# Patient Record
Sex: MALE | Race: BLACK OR AFRICAN AMERICAN | HISPANIC OR LATINO | Employment: OTHER | URBAN - METROPOLITAN AREA
[De-identification: names, ages, dates, MRNs, and addresses within clinical notes are randomized per-mention and may not be internally consistent; named-entity substitution may affect disease eponyms.]

---

## 2019-07-30 DIAGNOSIS — M25.561 PAIN IN BOTH KNEES: Primary | ICD-10-CM

## 2019-07-30 DIAGNOSIS — M25.562 PAIN IN BOTH KNEES: Primary | ICD-10-CM

## 2019-08-26 ENCOUNTER — CLINICAL SUPPORT (OUTPATIENT)
Dept: REHABILITATION | Facility: HOSPITAL | Age: 59
End: 2019-08-26
Payer: MEDICARE

## 2019-08-26 DIAGNOSIS — M25.562 CHRONIC PAIN OF BOTH KNEES: Primary | ICD-10-CM

## 2019-08-26 DIAGNOSIS — G89.29 CHRONIC PAIN OF BOTH KNEES: Primary | ICD-10-CM

## 2019-08-26 DIAGNOSIS — R29.898 WEAKNESS OF BOTH LOWER EXTREMITIES: ICD-10-CM

## 2019-08-26 DIAGNOSIS — M25.662 DECREASED RANGE OF MOTION OF BOTH KNEES: ICD-10-CM

## 2019-08-26 DIAGNOSIS — M25.561 CHRONIC PAIN OF BOTH KNEES: Primary | ICD-10-CM

## 2019-08-26 DIAGNOSIS — M25.661 DECREASED RANGE OF MOTION OF BOTH KNEES: ICD-10-CM

## 2019-08-26 PROCEDURE — 97162 PT EVAL MOD COMPLEX 30 MIN: CPT | Mod: PN

## 2019-08-26 PROCEDURE — 97110 THERAPEUTIC EXERCISES: CPT | Mod: PN
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
## 2019-09-11 ENCOUNTER — CLINICAL SUPPORT (OUTPATIENT)
Dept: REHABILITATION | Facility: HOSPITAL | Age: 59
End: 2019-09-11
Payer: MEDICARE

## 2019-09-11 DIAGNOSIS — G89.29 CHRONIC PAIN OF BOTH KNEES: Primary | ICD-10-CM

## 2019-09-11 DIAGNOSIS — M25.562 CHRONIC PAIN OF BOTH KNEES: Primary | ICD-10-CM

## 2019-09-11 DIAGNOSIS — M25.561 CHRONIC PAIN OF BOTH KNEES: Primary | ICD-10-CM

## 2019-09-11 DIAGNOSIS — M25.661 DECREASED RANGE OF MOTION OF BOTH KNEES: ICD-10-CM

## 2019-09-11 DIAGNOSIS — M25.662 DECREASED RANGE OF MOTION OF BOTH KNEES: ICD-10-CM

## 2019-09-11 DIAGNOSIS — R29.898 WEAKNESS OF BOTH LOWER EXTREMITIES: ICD-10-CM

## 2019-09-11 PROCEDURE — 97110 THERAPEUTIC EXERCISES: CPT | Mod: PN
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
## 2019-09-18 ENCOUNTER — CLINICAL SUPPORT (OUTPATIENT)
Dept: REHABILITATION | Facility: HOSPITAL | Age: 59
End: 2019-09-18
Payer: MEDICARE

## 2019-09-18 DIAGNOSIS — R29.898 WEAKNESS OF BOTH LOWER EXTREMITIES: ICD-10-CM

## 2019-09-18 DIAGNOSIS — G89.29 CHRONIC PAIN OF BOTH KNEES: Primary | ICD-10-CM

## 2019-09-18 DIAGNOSIS — M25.561 CHRONIC PAIN OF BOTH KNEES: Primary | ICD-10-CM

## 2019-09-18 DIAGNOSIS — M25.562 CHRONIC PAIN OF BOTH KNEES: Primary | ICD-10-CM

## 2019-09-18 DIAGNOSIS — M25.661 DECREASED RANGE OF MOTION OF BOTH KNEES: ICD-10-CM

## 2019-09-18 DIAGNOSIS — M25.662 DECREASED RANGE OF MOTION OF BOTH KNEES: ICD-10-CM

## 2019-09-18 PROCEDURE — 97110 THERAPEUTIC EXERCISES: CPT | Mod: PN
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
## 2019-10-11 ENCOUNTER — CLINICAL SUPPORT (OUTPATIENT)
Dept: REHABILITATION | Facility: HOSPITAL | Age: 59
End: 2019-10-11
Payer: MEDICARE

## 2019-10-11 DIAGNOSIS — M25.662 DECREASED RANGE OF MOTION OF BOTH KNEES: ICD-10-CM

## 2019-10-11 DIAGNOSIS — M25.661 DECREASED RANGE OF MOTION OF BOTH KNEES: ICD-10-CM

## 2019-10-11 DIAGNOSIS — M25.562 CHRONIC PAIN OF BOTH KNEES: Primary | ICD-10-CM

## 2019-10-11 DIAGNOSIS — G89.29 CHRONIC PAIN OF BOTH KNEES: Primary | ICD-10-CM

## 2019-10-11 DIAGNOSIS — R29.898 WEAKNESS OF BOTH LOWER EXTREMITIES: ICD-10-CM

## 2019-10-11 DIAGNOSIS — M25.561 CHRONIC PAIN OF BOTH KNEES: Primary | ICD-10-CM

## 2019-10-11 PROCEDURE — 97110 THERAPEUTIC EXERCISES: CPT | Mod: PN

## 2019-10-16 ENCOUNTER — CLINICAL SUPPORT (OUTPATIENT)
Dept: REHABILITATION | Facility: HOSPITAL | Age: 59
End: 2019-10-16
Payer: MEDICARE

## 2019-10-16 DIAGNOSIS — M25.562 CHRONIC PAIN OF BOTH KNEES: ICD-10-CM

## 2019-10-16 DIAGNOSIS — M25.662 DECREASED RANGE OF MOTION OF BOTH KNEES: ICD-10-CM

## 2019-10-16 DIAGNOSIS — R29.898 WEAKNESS OF BOTH LOWER EXTREMITIES: ICD-10-CM

## 2019-10-16 DIAGNOSIS — G89.29 CHRONIC PAIN OF BOTH KNEES: ICD-10-CM

## 2019-10-16 DIAGNOSIS — M25.661 DECREASED RANGE OF MOTION OF BOTH KNEES: ICD-10-CM

## 2019-10-16 DIAGNOSIS — M25.561 CHRONIC PAIN OF BOTH KNEES: ICD-10-CM

## 2019-10-16 PROCEDURE — 97110 THERAPEUTIC EXERCISES: CPT | Mod: PN
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
## 2020-07-27 ENCOUNTER — OFFICE VISIT (OUTPATIENT)
Dept: PODIATRY | Facility: CLINIC | Age: 60
End: 2020-07-27
Payer: MEDICARE

## 2020-07-27 VITALS
HEIGHT: 74 IN | BODY MASS INDEX: 40.43 KG/M2 | WEIGHT: 315 LBS | DIASTOLIC BLOOD PRESSURE: 95 MMHG | HEART RATE: 73 BPM | SYSTOLIC BLOOD PRESSURE: 156 MMHG

## 2020-07-27 DIAGNOSIS — B35.1 ONYCHOMYCOSIS DUE TO DERMATOPHYTE: Primary | ICD-10-CM

## 2020-07-27 DIAGNOSIS — R20.2 PARESTHESIA: ICD-10-CM

## 2020-07-27 PROCEDURE — 99499 RISK ADDL DX/OHS AUDIT: ICD-10-PCS | Mod: S$GLB,,, | Performed by: PODIATRIST

## 2020-07-27 PROCEDURE — 99499 UNLISTED E&M SERVICE: CPT | Mod: S$GLB,,, | Performed by: PODIATRIST

## 2020-07-27 PROCEDURE — 3008F BODY MASS INDEX DOCD: CPT | Mod: CPTII,S$GLB,, | Performed by: PODIATRIST

## 2020-07-27 PROCEDURE — 99999 PR PBB SHADOW E&M-NEW PATIENT-LVL III: CPT | Mod: PBBFAC,,, | Performed by: PODIATRIST

## 2020-07-27 PROCEDURE — 99999 PR PBB SHADOW E&M-NEW PATIENT-LVL III: ICD-10-PCS | Mod: PBBFAC,,, | Performed by: PODIATRIST

## 2020-07-27 PROCEDURE — 99203 OFFICE O/P NEW LOW 30 MIN: CPT | Mod: S$GLB,,, | Performed by: PODIATRIST

## 2020-07-27 PROCEDURE — 99203 PR OFFICE/OUTPT VISIT, NEW, LEVL III, 30-44 MIN: ICD-10-PCS | Mod: S$GLB,,, | Performed by: PODIATRIST

## 2020-07-27 PROCEDURE — 3008F PR BODY MASS INDEX (BMI) DOCUMENTED: ICD-10-PCS | Mod: CPTII,S$GLB,, | Performed by: PODIATRIST

## 2020-07-27 RX ORDER — KETOCONAZOLE 20 MG/G
CREAM TOPICAL DAILY
Qty: 1 TUBE | Refills: 3 | Status: SHIPPED | OUTPATIENT
Start: 2020-07-27

## 2020-07-27 RX ORDER — GABAPENTIN 100 MG/1
100 CAPSULE ORAL 3 TIMES DAILY
COMMUNITY
End: 2021-12-03

## 2020-07-27 RX ORDER — HYDROCHLOROTHIAZIDE 12.5 MG/1
12.5 CAPSULE ORAL DAILY
COMMUNITY
End: 2021-12-03

## 2020-07-27 RX ORDER — IBUPROFEN 800 MG/1
800 TABLET ORAL 3 TIMES DAILY
Status: ON HOLD | COMMUNITY
End: 2021-12-06 | Stop reason: HOSPADM

## 2020-07-27 RX ORDER — LOSARTAN POTASSIUM 25 MG/1
25 TABLET ORAL DAILY
COMMUNITY
End: 2021-12-03

## 2020-07-27 NOTE — LETTER
July 28, 2020      Eric Oconnor MD  [address] [facility] [address]  [address]  [place] [address]           [facility]  [address] [facility]  [place] [address]  Phone: [phone]          Patient: Dru Baron   MR Number: [id number]   YOB: 1960   Date of Visit: 7/27/2020       Dear Dr. Eric Oconnor:    Thank you for referring Dru Baron to me for evaluation. Attached you will find relevant portions of my assessment and plan of care.    If you have questions, please do not hesitate to call me. I look forward to following Dru Baron along with you.    Sincerely,    Lillian Jaeger, JENNIFER    Enclosure  CC:  No Recipients    If you would like to receive this communication electronically, please contact [email or web address] or [phone] to request more information on [email or web address] Link access.    For providers and/or their staff who would like to refer a patient to [facility], please contact us through our one-stop-shop provider referral line, [facility], at [phone].    If you feel you have received this communication in error or would no longer like to receive these types of communications, please e-mail [email or web address]

## 2020-07-27 NOTE — PATIENT INSTRUCTIONS
Shoe recommendations: (try [email or web address], [email or web address] , [email or web address], or shoes.com for discounted prices) you can visit DSW shoes in [place] as well    Asics (GT 1000 or gel foundations), new balance, chanell (stabil c3),  Ronak (transcend), vionic, propet, Hoka (One)  (tennis shoe)    soft brand, clarks, crocs, naot, aerosoles, naturalizers, SAS, ecco, wes, tom ash (dress shoes)    Vionic, volitiles, burkenstocks, fitflops, naot, propet (sandals)    Nike comfort thong sandals, crocs,dr comfort  (house shoes)

## 2020-07-28 ENCOUNTER — HOSPITAL ENCOUNTER (OUTPATIENT)
Dept: CARDIOLOGY | Facility: HOSPITAL | Age: 60
Discharge: HOME | End: 2020-07-28
Attending: PODIATRIST
Payer: MEDICARE

## 2020-07-28 DIAGNOSIS — B35.1 ONYCHOMYCOSIS DUE TO DERMATOPHYTE: ICD-10-CM

## 2020-07-28 DIAGNOSIS — R20.2 PARESTHESIA: ICD-10-CM

## 2020-07-28 PROCEDURE — 93922 CV US ANKLE BRACHIAL INDICES WO STRESS W TOE TRACINGS (CUPID ONLY): ICD-10-PCS | Mod: 26,,, | Performed by: SURGERY

## 2020-07-28 PROCEDURE — 93922 UPR/L XTREMITY ART 2 LEVELS: CPT | Mod: 26,,, | Performed by: SURGERY

## 2020-07-28 PROCEDURE — 93922 UPR/L XTREMITY ART 2 LEVELS: CPT | Mod: 50

## 2020-07-28 NOTE — PROGRESS NOTES
Subjective:      Patient ID: Dru Baron is a 60 y.o. male.    Chief Complaint: Nail Care, Callouses, and Numbness (mainly left foot)    Dru is a 60 y.o. male who presents to the podiatry clinic  with complaint of  left foot pain. Onset of the symptoms was several weeks ago. Precipitating event: none known. Current symptoms include: ability to bear weight, but with some pain. Aggravating factors: any weight bearing. Symptoms have progressed to a point and plateaued. Patient has had no prior foot problems. Evaluation to date: none. Treatment to date: none. Patients rates pain 5/10 on pain scale.        Review of Systems   Constitution: Negative for chills.   Cardiovascular: Negative for chest pain and claudication.   Respiratory: Negative for cough.    Skin: Positive for color change, dry skin and nail changes.   Musculoskeletal: Positive for joint pain.   Gastrointestinal: Negative for nausea.   Neurological: Positive for paresthesias. Negative for numbness.           Objective:      Physical Exam  Constitutional:       Appearance: He is well-developed.      Comments: Oriented to time, place, and person.   Cardiovascular:      Comments: DP and PT pulses are palpable bilaterally. 3 sec capillary refill time and toes and feet are warm to touch proximally .  There is  hair growth on the feet and toes b/l. There is no edema b/l. No spider veins or varicosities present b/l.     Musculoskeletal:      Comments: Equinus noted b/l ankles with < 10 deg DF noted. MMT 5/5 in DF/PF/Inv/Ev resistance with no reproduction of pain in any direction. Passive range of motion of ankle and pedal joints is painless b/l.     Feet:      Right foot:      Skin integrity: No callus or dry skin.      Left foot:      Skin integrity: No callus or dry skin.   Lymphadenopathy:      Comments: Negative lymphadenopathy bilateral popliteal fossa and tarsal tunnel.   Skin:     Comments: No open lesions, lacerations or wounds noted.Interdigital spaces  clean, dry and intact b/l. No erythema noted to b/l foot.  Focal hyperkeratotic lesion consisting entirely of hyperkeratotic tissue without open skin, drainage, pus, fluctuance, malodor, or signs of infection: Left plantar foot submet 5.     Scaling dryness in a moccasin distribution is noted to the bilateral lower extremities         Neurological:      Mental Status: He is alert.      Comments: Light touch, proprioception, and sharp/dull sensation are all intact bilaterally. Protective threshold with the [place]Wienstein monofilament is intact bilaterally.      Subjective paresthesias with no clearly identifiable source or trigger.      Psychiatric:         Behavior: Behavior is cooperative.               Assessment:       Encounter Diagnoses   Name Primary?    Onychomycosis due to dermatophyte Yes    Paresthesia          Plan:       Dru was seen today for nail care, callouses and numbness.    Diagnoses and all orders for this visit:    Onychomycosis due to dermatophyte  -     CV Ankle Brachial Indices WO Stress W Toe Tracings; Future    Paresthesia  -     CV Ankle Brachial Indices WO Stress W Toe Tracings; Future    Other orders  -     ketoconazole (NIZORAL) 2 % cream; Apply topically once daily.      I counseled the patient on his conditions, their implications and medical management.      ALEXIS ordered    Ketoconazole 2% topical cream prescribed for treatment of aforementioned tinea pedis. Patient will use this medication as directed in addition to thourougly drying between toes daily, and applying powder as needed    Discussed conservative treatment with shoes of adequate dimensions, material, and style to alleviate symptoms and delay or prevent surgical intervention.    RTC PRN.

## 2020-07-28 NOTE — PROGRESS NOTES
Patient, Dru Baron (MRN #[id number]), presented with a recorded BMI of 44.94 kg/m^2 consistent with the definition of morbid obesity (ICD-10 E66.01). The patient's morbid obesity was monitored, evaluated, addressed and/or treated. This addendum to the medical record is made on 07/28/2020.

## 2020-07-31 LAB
LEFT ABI: 1.51
LEFT ARM BP: 168 MMHG
LEFT DORSALIS PEDIS: 254 MMHG
LEFT POSTERIOR TIBIAL: 208 MMHG
LEFT TBI: 1.04
LEFT TOE PRESSURE: 174 MMHG
RIGHT ABI: 1.26
RIGHT ARM BP: 160 MMHG
RIGHT DORSALIS PEDIS: 211 MMHG
RIGHT POSTERIOR TIBIAL: 186 MMHG
RIGHT TBI: 0.88
RIGHT TOE PRESSURE: 148 MMHG

## 2021-04-22 ENCOUNTER — OFFICE VISIT (OUTPATIENT)
Dept: OTOLARYNGOLOGY | Facility: CLINIC | Age: 61
End: 2021-04-22
Payer: MEDICARE

## 2021-04-22 VITALS
DIASTOLIC BLOOD PRESSURE: 94 MMHG | SYSTOLIC BLOOD PRESSURE: 150 MMHG | BODY MASS INDEX: 40.43 KG/M2 | WEIGHT: 315 LBS | TEMPERATURE: 97 F | HEIGHT: 74 IN

## 2021-04-22 DIAGNOSIS — R09.A2 GLOBUS SENSATION: ICD-10-CM

## 2021-04-22 DIAGNOSIS — J34.3 NASAL TURBINATE HYPERTROPHY: ICD-10-CM

## 2021-04-22 DIAGNOSIS — R09.81 NASAL CONGESTION: ICD-10-CM

## 2021-04-22 DIAGNOSIS — R09.89 THROAT CLEARING: ICD-10-CM

## 2021-04-22 DIAGNOSIS — R06.83 SNORING: ICD-10-CM

## 2021-04-22 DIAGNOSIS — R09.82 PND (POST-NASAL DRIP): Primary | ICD-10-CM

## 2021-04-22 PROCEDURE — 99204 PR OFFICE/OUTPT VISIT, NEW, LEVL IV, 45-59 MIN: ICD-10-PCS | Mod: S$GLB,,, | Performed by: NURSE PRACTITIONER

## 2021-04-22 PROCEDURE — 99204 OFFICE O/P NEW MOD 45 MIN: CPT | Mod: S$GLB,,, | Performed by: NURSE PRACTITIONER

## 2021-04-22 PROCEDURE — 1125F AMNT PAIN NOTED PAIN PRSNT: CPT | Mod: S$GLB,,, | Performed by: NURSE PRACTITIONER

## 2021-04-22 PROCEDURE — 3008F BODY MASS INDEX DOCD: CPT | Mod: CPTII,S$GLB,, | Performed by: NURSE PRACTITIONER

## 2021-04-22 PROCEDURE — 3008F PR BODY MASS INDEX (BMI) DOCUMENTED: ICD-10-PCS | Mod: CPTII,S$GLB,, | Performed by: NURSE PRACTITIONER

## 2021-04-22 PROCEDURE — 1125F PR PAIN SEVERITY QUANTIFIED, PAIN PRESENT: ICD-10-PCS | Mod: S$GLB,,, | Performed by: NURSE PRACTITIONER

## 2021-04-22 RX ORDER — GABAPENTIN 600 MG/1
TABLET ORAL
COMMUNITY
Start: 2021-04-21

## 2021-04-22 RX ORDER — HYDROCHLOROTHIAZIDE 25 MG/1
25 TABLET ORAL DAILY
COMMUNITY
Start: 2021-03-01 | End: 2023-05-07

## 2021-04-22 RX ORDER — LOSARTAN POTASSIUM 100 MG/1
100 TABLET ORAL DAILY
COMMUNITY
Start: 2021-03-05 | End: 2023-05-07

## 2021-04-22 RX ORDER — LEVOCETIRIZINE DIHYDROCHLORIDE 5 MG/1
TABLET, FILM COATED ORAL
COMMUNITY
Start: 2021-03-02

## 2021-04-22 RX ORDER — FLUTICASONE PROPIONATE 50 MCG
2 SPRAY, SUSPENSION (ML) NASAL DAILY
Qty: 11.1 ML | Refills: 2 | Status: SHIPPED | OUTPATIENT
Start: 2021-04-22 | End: 2021-07-12

## 2021-04-22 RX ORDER — IBUPROFEN 800 MG/1
800 TABLET ORAL 3 TIMES DAILY PRN
COMMUNITY
Start: 2021-03-01

## 2021-04-30 ENCOUNTER — OFFICE VISIT (OUTPATIENT)
Dept: PODIATRY | Facility: CLINIC | Age: 61
End: 2021-04-30
Payer: MEDICARE

## 2021-04-30 ENCOUNTER — TELEPHONE (OUTPATIENT)
Dept: PODIATRY | Facility: CLINIC | Age: 61
End: 2021-04-30

## 2021-04-30 VITALS
BODY MASS INDEX: 40.43 KG/M2 | SYSTOLIC BLOOD PRESSURE: 187 MMHG | WEIGHT: 315 LBS | HEART RATE: 71 BPM | HEIGHT: 74 IN | DIASTOLIC BLOOD PRESSURE: 102 MMHG

## 2021-04-30 DIAGNOSIS — L84 CORN OR CALLUS: ICD-10-CM

## 2021-04-30 DIAGNOSIS — B35.1 ONYCHOMYCOSIS DUE TO DERMATOPHYTE: Primary | ICD-10-CM

## 2021-04-30 PROCEDURE — 3008F BODY MASS INDEX DOCD: CPT | Mod: CPTII,,, | Performed by: PODIATRIST

## 2021-04-30 PROCEDURE — 17999 UNLISTD PX SKN MUC MEMB SUBQ: CPT | Mod: CSM,,, | Performed by: PODIATRIST

## 2021-04-30 PROCEDURE — 17999 PR NON-COVERED FOOT CARE: ICD-10-PCS | Mod: CSM,,, | Performed by: PODIATRIST

## 2021-04-30 PROCEDURE — 1126F AMNT PAIN NOTED NONE PRSNT: CPT | Mod: ,,, | Performed by: PODIATRIST

## 2021-04-30 PROCEDURE — 99204 OFFICE O/P NEW MOD 45 MIN: CPT | Mod: ,,, | Performed by: PODIATRIST

## 2021-04-30 PROCEDURE — 99999 PR PBB SHADOW E&M-EST. PATIENT-LVL III: CPT | Mod: PBBFAC,,, | Performed by: PODIATRIST

## 2021-04-30 PROCEDURE — 99999 PR PBB SHADOW E&M-EST. PATIENT-LVL III: ICD-10-PCS | Mod: PBBFAC,,, | Performed by: PODIATRIST

## 2021-04-30 PROCEDURE — 3008F PR BODY MASS INDEX (BMI) DOCUMENTED: ICD-10-PCS | Mod: CPTII,,, | Performed by: PODIATRIST

## 2021-04-30 PROCEDURE — 1126F PR PAIN SEVERITY QUANTIFIED, NO PAIN PRESENT: ICD-10-PCS | Mod: ,,, | Performed by: PODIATRIST

## 2021-04-30 PROCEDURE — 99204 PR OFFICE/OUTPT VISIT, NEW, LEVL IV, 45-59 MIN: ICD-10-PCS | Mod: ,,, | Performed by: PODIATRIST

## 2021-04-30 RX ORDER — AMMONIUM LACTATE 12 G/100G
CREAM TOPICAL
Qty: 140 G | Refills: 11 | Status: SHIPPED | OUTPATIENT
Start: 2021-04-30

## 2021-04-30 RX ORDER — CICLOPIROX 80 MG/ML
SOLUTION TOPICAL NIGHTLY
Qty: 6.6 ML | Refills: 11 | Status: SHIPPED | OUTPATIENT
Start: 2021-04-30

## 2021-08-23 ENCOUNTER — OFFICE VISIT (OUTPATIENT)
Dept: PODIATRY | Facility: CLINIC | Age: 61
End: 2021-08-23
Payer: MEDICARE

## 2021-08-23 VITALS — BODY MASS INDEX: 40.43 KG/M2 | HEIGHT: 74 IN | WEIGHT: 315 LBS

## 2021-08-23 DIAGNOSIS — L84 CORN OR CALLUS: ICD-10-CM

## 2021-08-23 DIAGNOSIS — B35.1 ONYCHOMYCOSIS DUE TO DERMATOPHYTE: Primary | ICD-10-CM

## 2021-08-23 PROCEDURE — 99499 NO LOS: ICD-10-PCS | Mod: ,,, | Performed by: PODIATRIST

## 2021-08-23 PROCEDURE — 1159F PR MEDICATION LIST DOCUMENTED IN MEDICAL RECORD: ICD-10-PCS | Mod: CPTII,,, | Performed by: PODIATRIST

## 2021-08-23 PROCEDURE — 1160F PR REVIEW ALL MEDS BY PRESCRIBER/CLIN PHARMACIST DOCUMENTED: ICD-10-PCS | Mod: CPTII,,, | Performed by: PODIATRIST

## 2021-08-23 PROCEDURE — 1159F MED LIST DOCD IN RCRD: CPT | Mod: CPTII,,, | Performed by: PODIATRIST

## 2021-08-23 PROCEDURE — 1126F AMNT PAIN NOTED NONE PRSNT: CPT | Mod: CPTII,,, | Performed by: PODIATRIST

## 2021-08-23 PROCEDURE — 1160F RVW MEDS BY RX/DR IN RCRD: CPT | Mod: CPTII,,, | Performed by: PODIATRIST

## 2021-08-23 PROCEDURE — 3008F PR BODY MASS INDEX (BMI) DOCUMENTED: ICD-10-PCS | Mod: CPTII,,, | Performed by: PODIATRIST

## 2021-08-23 PROCEDURE — 1126F PR PAIN SEVERITY QUANTIFIED, NO PAIN PRESENT: ICD-10-PCS | Mod: CPTII,,, | Performed by: PODIATRIST

## 2021-08-23 PROCEDURE — 17999 UNLISTD PX SKN MUC MEMB SUBQ: CPT | Mod: CSM,S$GLB,, | Performed by: PODIATRIST

## 2021-08-23 PROCEDURE — 3008F BODY MASS INDEX DOCD: CPT | Mod: CPTII,,, | Performed by: PODIATRIST

## 2021-08-23 PROCEDURE — 99999 PR PBB SHADOW E&M-EST. PATIENT-LVL III: ICD-10-PCS | Mod: PBBFAC,,, | Performed by: PODIATRIST

## 2021-08-23 PROCEDURE — 99499 UNLISTED E&M SERVICE: CPT | Mod: ,,, | Performed by: PODIATRIST

## 2021-08-23 PROCEDURE — 99999 PR PBB SHADOW E&M-EST. PATIENT-LVL III: CPT | Mod: PBBFAC,,, | Performed by: PODIATRIST

## 2021-08-23 PROCEDURE — 17999 PR NON-COVERED FOOT CARE: ICD-10-PCS | Mod: CSM,S$GLB,, | Performed by: PODIATRIST

## 2021-10-06 ENCOUNTER — OFFICE VISIT (OUTPATIENT)
Dept: UROLOGY | Facility: CLINIC | Age: 61
End: 2021-10-06
Payer: MEDICARE

## 2021-10-06 VITALS — BODY MASS INDEX: 40.43 KG/M2 | HEIGHT: 74 IN | WEIGHT: 315 LBS

## 2021-10-06 DIAGNOSIS — R35.1 BPH ASSOCIATED WITH NOCTURIA: ICD-10-CM

## 2021-10-06 DIAGNOSIS — Z12.5 PROSTATE CANCER SCREENING: ICD-10-CM

## 2021-10-06 DIAGNOSIS — N40.1 BPH ASSOCIATED WITH NOCTURIA: ICD-10-CM

## 2021-10-06 DIAGNOSIS — R30.0 DYSURIA: Primary | ICD-10-CM

## 2021-10-06 LAB
BILIRUB SERPL-MCNC: NORMAL MG/DL
BLOOD URINE, POC: NORMAL
CLARITY, POC UA: CLEAR
COLOR, POC UA: YELLOW
GLUCOSE UR QL STRIP: NORMAL
KETONES UR QL STRIP: NORMAL
LEUKOCYTE ESTERASE URINE, POC: NORMAL
NITRITE, POC UA: NORMAL
PH, POC UA: 5
POC RESIDUAL URINE VOLUME: 47 ML (ref 0–100)
PROTEIN, POC: NORMAL
SPECIFIC GRAVITY, POC UA: 1015
UROBILINOGEN, POC UA: NORMAL

## 2021-10-06 PROCEDURE — 3008F BODY MASS INDEX DOCD: CPT | Mod: CPTII,S$GLB,, | Performed by: STUDENT IN AN ORGANIZED HEALTH CARE EDUCATION/TRAINING PROGRAM

## 2021-10-06 PROCEDURE — 99999 PR PBB SHADOW E&M-EST. PATIENT-LVL III: CPT | Mod: PBBFAC,,, | Performed by: STUDENT IN AN ORGANIZED HEALTH CARE EDUCATION/TRAINING PROGRAM

## 2021-10-06 PROCEDURE — 99999 PR PBB SHADOW E&M-EST. PATIENT-LVL III: ICD-10-PCS | Mod: PBBFAC,,, | Performed by: STUDENT IN AN ORGANIZED HEALTH CARE EDUCATION/TRAINING PROGRAM

## 2021-10-06 PROCEDURE — 51798 US URINE CAPACITY MEASURE: CPT | Mod: S$GLB,,, | Performed by: STUDENT IN AN ORGANIZED HEALTH CARE EDUCATION/TRAINING PROGRAM

## 2021-10-06 PROCEDURE — 4010F ACE/ARB THERAPY RXD/TAKEN: CPT | Mod: CPTII,S$GLB,, | Performed by: STUDENT IN AN ORGANIZED HEALTH CARE EDUCATION/TRAINING PROGRAM

## 2021-10-06 PROCEDURE — 81002 URINALYSIS NONAUTO W/O SCOPE: CPT | Mod: S$GLB,,, | Performed by: STUDENT IN AN ORGANIZED HEALTH CARE EDUCATION/TRAINING PROGRAM

## 2021-10-06 PROCEDURE — 4010F PR ACE/ARB THEARPY RXD/TAKEN: ICD-10-PCS | Mod: CPTII,S$GLB,, | Performed by: STUDENT IN AN ORGANIZED HEALTH CARE EDUCATION/TRAINING PROGRAM

## 2021-10-06 PROCEDURE — 1160F PR REVIEW ALL MEDS BY PRESCRIBER/CLIN PHARMACIST DOCUMENTED: ICD-10-PCS | Mod: CPTII,S$GLB,, | Performed by: STUDENT IN AN ORGANIZED HEALTH CARE EDUCATION/TRAINING PROGRAM

## 2021-10-06 PROCEDURE — 51798 POCT BLADDER SCAN: ICD-10-PCS | Mod: S$GLB,,, | Performed by: STUDENT IN AN ORGANIZED HEALTH CARE EDUCATION/TRAINING PROGRAM

## 2021-10-06 PROCEDURE — 81002 POCT URINE DIPSTICK WITHOUT MICROSCOPE: ICD-10-PCS | Mod: S$GLB,,, | Performed by: STUDENT IN AN ORGANIZED HEALTH CARE EDUCATION/TRAINING PROGRAM

## 2021-10-06 PROCEDURE — 87086 URINE CULTURE/COLONY COUNT: CPT | Performed by: STUDENT IN AN ORGANIZED HEALTH CARE EDUCATION/TRAINING PROGRAM

## 2021-10-06 PROCEDURE — 99204 PR OFFICE/OUTPT VISIT, NEW, LEVL IV, 45-59 MIN: ICD-10-PCS | Mod: S$GLB,,, | Performed by: STUDENT IN AN ORGANIZED HEALTH CARE EDUCATION/TRAINING PROGRAM

## 2021-10-06 PROCEDURE — 1159F PR MEDICATION LIST DOCUMENTED IN MEDICAL RECORD: ICD-10-PCS | Mod: CPTII,S$GLB,, | Performed by: STUDENT IN AN ORGANIZED HEALTH CARE EDUCATION/TRAINING PROGRAM

## 2021-10-06 PROCEDURE — 1159F MED LIST DOCD IN RCRD: CPT | Mod: CPTII,S$GLB,, | Performed by: STUDENT IN AN ORGANIZED HEALTH CARE EDUCATION/TRAINING PROGRAM

## 2021-10-06 PROCEDURE — 1160F RVW MEDS BY RX/DR IN RCRD: CPT | Mod: CPTII,S$GLB,, | Performed by: STUDENT IN AN ORGANIZED HEALTH CARE EDUCATION/TRAINING PROGRAM

## 2021-10-06 PROCEDURE — 99204 OFFICE O/P NEW MOD 45 MIN: CPT | Mod: S$GLB,,, | Performed by: STUDENT IN AN ORGANIZED HEALTH CARE EDUCATION/TRAINING PROGRAM

## 2021-10-06 PROCEDURE — 3008F PR BODY MASS INDEX (BMI) DOCUMENTED: ICD-10-PCS | Mod: CPTII,S$GLB,, | Performed by: STUDENT IN AN ORGANIZED HEALTH CARE EDUCATION/TRAINING PROGRAM

## 2021-10-06 RX ORDER — DOXYCYCLINE HYCLATE 100 MG
100 TABLET ORAL EVERY 12 HOURS
Qty: 28 TABLET | Refills: 0 | Status: SHIPPED | OUTPATIENT
Start: 2021-10-06 | End: 2021-10-20

## 2021-10-08 LAB — BACTERIA UR CULT: NO GROWTH

## 2021-10-12 ENCOUNTER — TELEPHONE (OUTPATIENT)
Dept: UROLOGY | Facility: CLINIC | Age: 61
End: 2021-10-12

## 2021-10-13 ENCOUNTER — TELEPHONE (OUTPATIENT)
Dept: UROLOGY | Facility: CLINIC | Age: 61
End: 2021-10-13

## 2021-12-03 ENCOUNTER — HOSPITAL ENCOUNTER (INPATIENT)
Facility: HOSPITAL | Age: 61
LOS: 3 days | Discharge: HOME | DRG: 378 | End: 2021-12-06
Attending: EMERGENCY MEDICINE | Admitting: EMERGENCY MEDICINE
Payer: MEDICARE

## 2021-12-03 DIAGNOSIS — R53.83 FATIGUE: ICD-10-CM

## 2021-12-03 DIAGNOSIS — D64.9 SYMPTOMATIC ANEMIA: ICD-10-CM

## 2021-12-03 DIAGNOSIS — K92.2 UPPER GI BLEED: Primary | ICD-10-CM

## 2021-12-03 DIAGNOSIS — K92.2 UPPER GI BLEEDING: ICD-10-CM

## 2021-12-03 PROBLEM — I95.9 HYPOTENSION: Status: ACTIVE | Noted: 2021-12-03

## 2021-12-03 PROBLEM — D69.6 THROMBOCYTOPENIA: Status: ACTIVE | Noted: 2021-12-03

## 2021-12-03 PROBLEM — K21.9 GERD (GASTROESOPHAGEAL REFLUX DISEASE): Status: ACTIVE | Noted: 2021-12-03

## 2021-12-03 PROBLEM — I10 ESSENTIAL HYPERTENSION: Status: ACTIVE | Noted: 2021-12-03

## 2021-12-03 PROBLEM — D62 ACUTE BLOOD LOSS ANEMIA: Status: ACTIVE | Noted: 2021-12-03

## 2021-12-03 LAB
ABO + RH BLD: NORMAL
ALBUMIN SERPL BCP-MCNC: 3.6 G/DL (ref 3.5–5.2)
ALP SERPL-CCNC: 53 U/L (ref 55–135)
ALT SERPL W/O P-5'-P-CCNC: 20 U/L (ref 10–44)
ANION GAP SERPL CALC-SCNC: 8 MMOL/L (ref 8–16)
AST SERPL-CCNC: 23 U/L (ref 10–40)
BASOPHILS # BLD AUTO: 0.03 K/UL (ref 0–0.2)
BASOPHILS # BLD AUTO: 0.04 K/UL (ref 0–0.2)
BASOPHILS NFR BLD: 0.4 % (ref 0–1.9)
BASOPHILS NFR BLD: 0.5 % (ref 0–1.9)
BILIRUB SERPL-MCNC: 0.4 MG/DL (ref 0.1–1)
BILIRUB UR QL STRIP: NEGATIVE
BLD GP AB SCN CELLS X3 SERPL QL: NORMAL
BUN SERPL-MCNC: 51 MG/DL (ref 6–20)
CALCIUM SERPL-MCNC: 9.7 MG/DL (ref 8.7–10.5)
CHLORIDE SERPL-SCNC: 101 MMOL/L (ref 95–110)
CLARITY UR: CLEAR
CO2 SERPL-SCNC: 26 MMOL/L (ref 23–29)
COLOR UR: YELLOW
CREAT SERPL-MCNC: 1.2 MG/DL (ref 0.5–1.4)
CTP QC/QA: YES
DIFFERENTIAL METHOD: ABNORMAL
DIFFERENTIAL METHOD: ABNORMAL
EOSINOPHIL # BLD AUTO: 0.3 K/UL (ref 0–0.5)
EOSINOPHIL # BLD AUTO: 0.4 K/UL (ref 0–0.5)
EOSINOPHIL NFR BLD: 4.2 % (ref 0–8)
EOSINOPHIL NFR BLD: 4.4 % (ref 0–8)
ERYTHROCYTE [DISTWIDTH] IN BLOOD BY AUTOMATED COUNT: 16.2 % (ref 11.5–14.5)
ERYTHROCYTE [DISTWIDTH] IN BLOOD BY AUTOMATED COUNT: 16.3 % (ref 11.5–14.5)
EST. GFR  (AFRICAN AMERICAN): >60 ML/MIN/1.73 M^2
EST. GFR  (NON AFRICAN AMERICAN): >60 ML/MIN/1.73 M^2
FERRITIN SERPL-MCNC: 16 NG/ML (ref 20–300)
GIANT PLATELETS BLD QL SMEAR: PRESENT
GIANT PLATELETS BLD QL SMEAR: PRESENT
GLUCOSE SERPL-MCNC: 100 MG/DL (ref 70–110)
GLUCOSE UR QL STRIP: NEGATIVE
HCT VFR BLD AUTO: 20.4 % (ref 40–54)
HCT VFR BLD AUTO: 20.6 % (ref 40–54)
HGB BLD-MCNC: 6.2 G/DL (ref 14–18)
HGB BLD-MCNC: 6.3 G/DL (ref 14–18)
HGB UR QL STRIP: NEGATIVE
IMM GRANULOCYTES # BLD AUTO: 0.04 K/UL (ref 0–0.04)
IMM GRANULOCYTES # BLD AUTO: 0.04 K/UL (ref 0–0.04)
IMM GRANULOCYTES NFR BLD AUTO: 0.5 % (ref 0–0.5)
IMM GRANULOCYTES NFR BLD AUTO: 0.5 % (ref 0–0.5)
INR PPP: 0.9 (ref 0.8–1.2)
INR PPP: 1 (ref 0.8–1.2)
IRON SERPL-MCNC: 19 UG/DL (ref 45–160)
KETONES UR QL STRIP: NEGATIVE
LACTATE SERPL-SCNC: 1.9 MMOL/L (ref 0.5–2.2)
LEUKOCYTE ESTERASE UR QL STRIP: NEGATIVE
LYMPHOCYTES # BLD AUTO: 1.9 K/UL (ref 1–4.8)
LYMPHOCYTES # BLD AUTO: 2 K/UL (ref 1–4.8)
LYMPHOCYTES NFR BLD: 23.6 % (ref 18–48)
LYMPHOCYTES NFR BLD: 23.8 % (ref 18–48)
MAGNESIUM SERPL-MCNC: 2.1 MG/DL (ref 1.6–2.6)
MCH RBC QN AUTO: 26.8 PG (ref 27–31)
MCH RBC QN AUTO: 27.2 PG (ref 27–31)
MCHC RBC AUTO-ENTMCNC: 30.4 G/DL (ref 32–36)
MCHC RBC AUTO-ENTMCNC: 30.6 G/DL (ref 32–36)
MCV RBC AUTO: 88 FL (ref 82–98)
MCV RBC AUTO: 89 FL (ref 82–98)
MONOCYTES # BLD AUTO: 0.6 K/UL (ref 0.3–1)
MONOCYTES # BLD AUTO: 0.6 K/UL (ref 0.3–1)
MONOCYTES NFR BLD: 7.1 % (ref 4–15)
MONOCYTES NFR BLD: 7.2 % (ref 4–15)
NEUTROPHILS # BLD AUTO: 5.1 K/UL (ref 1.8–7.7)
NEUTROPHILS # BLD AUTO: 5.3 K/UL (ref 1.8–7.7)
NEUTROPHILS NFR BLD: 63.7 % (ref 38–73)
NEUTROPHILS NFR BLD: 64.1 % (ref 38–73)
NITRITE UR QL STRIP: NEGATIVE
NRBC BLD-RTO: 0 /100 WBC
NRBC BLD-RTO: 0 /100 WBC
PH UR STRIP: 6 [PH] (ref 5–8)
PLATELET # BLD AUTO: 123 K/UL (ref 150–450)
PLATELET # BLD AUTO: 146 K/UL (ref 150–450)
PMV BLD AUTO: ABNORMAL FL (ref 9.2–12.9)
PMV BLD AUTO: ABNORMAL FL (ref 9.2–12.9)
POLYCHROMASIA BLD QL SMEAR: ABNORMAL
POTASSIUM SERPL-SCNC: 3.6 MMOL/L (ref 3.5–5.1)
PROT SERPL-MCNC: 6.4 G/DL (ref 6–8.4)
PROT UR QL STRIP: NEGATIVE
PROTHROMBIN TIME: 10.1 SEC (ref 9–12.5)
PROTHROMBIN TIME: 10.3 SEC (ref 9–12.5)
RBC # BLD AUTO: 2.31 M/UL (ref 4.6–6.2)
RBC # BLD AUTO: 2.32 M/UL (ref 4.6–6.2)
SARS-COV-2 RDRP RESP QL NAA+PROBE: NEGATIVE
SATURATED IRON: 4 % (ref 20–50)
SODIUM SERPL-SCNC: 135 MMOL/L (ref 136–145)
SP GR UR STRIP: 1.01 (ref 1–1.03)
TOTAL IRON BINDING CAPACITY: 440 UG/DL (ref 250–450)
TRANSFERRIN SERPL-MCNC: 297 MG/DL (ref 200–375)
TROPONIN I SERPL DL<=0.01 NG/ML-MCNC: 0.02 NG/ML (ref 0–0.03)
TSH SERPL DL<=0.005 MIU/L-ACNC: 1.86 UIU/ML (ref 0.4–4)
URN SPEC COLLECT METH UR: NORMAL
UROBILINOGEN UR STRIP-ACNC: NEGATIVE EU/DL
WBC # BLD AUTO: 7.91 K/UL (ref 3.9–12.7)
WBC # BLD AUTO: 8.35 K/UL (ref 3.9–12.7)

## 2021-12-03 PROCEDURE — 84443 ASSAY THYROID STIM HORMONE: CPT | Performed by: EMERGENCY MEDICINE

## 2021-12-03 PROCEDURE — 83605 ASSAY OF LACTIC ACID: CPT | Performed by: EMERGENCY MEDICINE

## 2021-12-03 PROCEDURE — 85025 COMPLETE CBC W/AUTO DIFF WBC: CPT | Mod: 91 | Performed by: HOSPITALIST

## 2021-12-03 PROCEDURE — 99285 EMERGENCY DEPT VISIT HI MDM: CPT | Mod: 25

## 2021-12-03 PROCEDURE — C9113 INJ PANTOPRAZOLE SODIUM, VIA: HCPCS | Performed by: HOSPITALIST

## 2021-12-03 PROCEDURE — 81003 URINALYSIS AUTO W/O SCOPE: CPT | Performed by: EMERGENCY MEDICINE

## 2021-12-03 PROCEDURE — 85610 PROTHROMBIN TIME: CPT | Mod: 91 | Performed by: HOSPITALIST

## 2021-12-03 PROCEDURE — 80053 COMPREHEN METABOLIC PANEL: CPT | Performed by: EMERGENCY MEDICINE

## 2021-12-03 PROCEDURE — 86900 BLOOD TYPING SEROLOGIC ABO: CPT | Performed by: EMERGENCY MEDICINE

## 2021-12-03 PROCEDURE — 82607 VITAMIN B-12: CPT | Performed by: HOSPITALIST

## 2021-12-03 PROCEDURE — 25000003 PHARM REV CODE 250: Performed by: HOSPITALIST

## 2021-12-03 PROCEDURE — 84466 ASSAY OF TRANSFERRIN: CPT | Performed by: HOSPITALIST

## 2021-12-03 PROCEDURE — 84484 ASSAY OF TROPONIN QUANT: CPT | Performed by: EMERGENCY MEDICINE

## 2021-12-03 PROCEDURE — 63600175 PHARM REV CODE 636 W HCPCS: Performed by: EMERGENCY MEDICINE

## 2021-12-03 PROCEDURE — 63600175 PHARM REV CODE 636 W HCPCS: Performed by: HOSPITALIST

## 2021-12-03 PROCEDURE — 86920 COMPATIBILITY TEST SPIN: CPT | Performed by: INTERNAL MEDICINE

## 2021-12-03 PROCEDURE — 21400001 HC TELEMETRY ROOM

## 2021-12-03 PROCEDURE — U0002 COVID-19 LAB TEST NON-CDC: HCPCS | Performed by: EMERGENCY MEDICINE

## 2021-12-03 PROCEDURE — C9113 INJ PANTOPRAZOLE SODIUM, VIA: HCPCS | Performed by: EMERGENCY MEDICINE

## 2021-12-03 PROCEDURE — 82728 ASSAY OF FERRITIN: CPT | Performed by: HOSPITALIST

## 2021-12-03 PROCEDURE — 86920 COMPATIBILITY TEST SPIN: CPT | Performed by: HOSPITALIST

## 2021-12-03 PROCEDURE — 83735 ASSAY OF MAGNESIUM: CPT | Performed by: EMERGENCY MEDICINE

## 2021-12-03 PROCEDURE — 82746 ASSAY OF FOLIC ACID SERUM: CPT | Performed by: HOSPITALIST

## 2021-12-03 PROCEDURE — 85610 PROTHROMBIN TIME: CPT | Performed by: EMERGENCY MEDICINE

## 2021-12-03 PROCEDURE — 96361 HYDRATE IV INFUSION ADD-ON: CPT

## 2021-12-03 PROCEDURE — 85025 COMPLETE CBC W/AUTO DIFF WBC: CPT | Performed by: EMERGENCY MEDICINE

## 2021-12-03 PROCEDURE — 96374 THER/PROPH/DIAG INJ IV PUSH: CPT

## 2021-12-03 RX ORDER — TALC
6 POWDER (GRAM) TOPICAL NIGHTLY PRN
Status: DISCONTINUED | OUTPATIENT
Start: 2021-12-03 | End: 2021-12-06 | Stop reason: HOSPADM

## 2021-12-03 RX ORDER — GABAPENTIN 600 MG/1
600 TABLET ORAL 2 TIMES DAILY
COMMUNITY

## 2021-12-03 RX ORDER — ACETAMINOPHEN 325 MG/1
650 TABLET ORAL EVERY 6 HOURS PRN
Status: DISCONTINUED | OUTPATIENT
Start: 2021-12-03 | End: 2021-12-06 | Stop reason: HOSPADM

## 2021-12-03 RX ORDER — PANTOPRAZOLE SODIUM 40 MG/10ML
40 INJECTION, POWDER, LYOPHILIZED, FOR SOLUTION INTRAVENOUS
Status: COMPLETED | OUTPATIENT
Start: 2021-12-03 | End: 2021-12-03

## 2021-12-03 RX ORDER — ONDANSETRON 2 MG/ML
4 INJECTION INTRAMUSCULAR; INTRAVENOUS EVERY 4 HOURS PRN
Status: DISCONTINUED | OUTPATIENT
Start: 2021-12-03 | End: 2021-12-06 | Stop reason: HOSPADM

## 2021-12-03 RX ORDER — PANTOPRAZOLE SODIUM 40 MG/10ML
40 INJECTION, POWDER, LYOPHILIZED, FOR SOLUTION INTRAVENOUS 2 TIMES DAILY
Status: DISCONTINUED | OUTPATIENT
Start: 2021-12-03 | End: 2021-12-06 | Stop reason: HOSPADM

## 2021-12-03 RX ORDER — HYDROCODONE BITARTRATE AND ACETAMINOPHEN 500; 5 MG/1; MG/1
TABLET ORAL
Status: DISCONTINUED | OUTPATIENT
Start: 2021-12-03 | End: 2021-12-04

## 2021-12-03 RX ORDER — GABAPENTIN 100 MG/1
100 CAPSULE ORAL 3 TIMES DAILY
Status: DISCONTINUED | OUTPATIENT
Start: 2021-12-03 | End: 2021-12-06 | Stop reason: HOSPADM

## 2021-12-03 RX ORDER — HYDROCHLOROTHIAZIDE 25 MG/1
25 TABLET ORAL DAILY
COMMUNITY

## 2021-12-03 RX ORDER — LOPERAMIDE HYDROCHLORIDE 2 MG/1
2 CAPSULE ORAL 4 TIMES DAILY PRN
Status: DISCONTINUED | OUTPATIENT
Start: 2021-12-03 | End: 2021-12-06 | Stop reason: HOSPADM

## 2021-12-03 RX ORDER — LOSARTAN POTASSIUM 100 MG/1
100 TABLET ORAL DAILY
COMMUNITY

## 2021-12-03 RX ORDER — SODIUM CHLORIDE 9 MG/ML
INJECTION, SOLUTION INTRAVENOUS CONTINUOUS
Status: DISCONTINUED | OUTPATIENT
Start: 2021-12-03 | End: 2021-12-06

## 2021-12-03 RX ADMIN — GABAPENTIN 100 MG: 100 CAPSULE ORAL at 09:12

## 2021-12-03 RX ADMIN — SODIUM CHLORIDE: 0.9 INJECTION, SOLUTION INTRAVENOUS at 06:12

## 2021-12-03 RX ADMIN — PANTOPRAZOLE SODIUM 40 MG: 40 INJECTION, POWDER, FOR SOLUTION INTRAVENOUS at 04:12

## 2021-12-03 RX ADMIN — PANTOPRAZOLE SODIUM 40 MG: 40 INJECTION, POWDER, FOR SOLUTION INTRAVENOUS at 05:12

## 2021-12-03 RX ADMIN — SODIUM CHLORIDE, SODIUM LACTATE, POTASSIUM CHLORIDE, AND CALCIUM CHLORIDE 1000 ML: .6; .31; .03; .02 INJECTION, SOLUTION INTRAVENOUS at 05:12

## 2021-12-03 RX ADMIN — SODIUM CHLORIDE, SODIUM LACTATE, POTASSIUM CHLORIDE, AND CALCIUM CHLORIDE 1000 ML: .6; .31; .03; .02 INJECTION, SOLUTION INTRAVENOUS at 02:12

## 2021-12-04 PROBLEM — E87.6 HYPOKALEMIA: Status: ACTIVE | Noted: 2021-12-04

## 2021-12-04 LAB
ANION GAP SERPL CALC-SCNC: 5 MMOL/L (ref 8–16)
ANISOCYTOSIS BLD QL SMEAR: SLIGHT
BASOPHILS # BLD AUTO: 0.03 K/UL (ref 0–0.2)
BASOPHILS # BLD AUTO: 0.03 K/UL (ref 0–0.2)
BASOPHILS # BLD AUTO: 0.05 K/UL (ref 0–0.2)
BASOPHILS # BLD AUTO: 0.05 K/UL (ref 0–0.2)
BASOPHILS NFR BLD: 0.3 % (ref 0–1.9)
BASOPHILS NFR BLD: 0.4 % (ref 0–1.9)
BASOPHILS NFR BLD: 0.6 % (ref 0–1.9)
BASOPHILS NFR BLD: 0.6 % (ref 0–1.9)
BLD PROD TYP BPU: NORMAL
BLD PROD TYP BPU: NORMAL
BLOOD UNIT EXPIRATION DATE: NORMAL
BLOOD UNIT EXPIRATION DATE: NORMAL
BLOOD UNIT TYPE CODE: 7300
BLOOD UNIT TYPE CODE: 7300
BLOOD UNIT TYPE: NORMAL
BLOOD UNIT TYPE: NORMAL
BUN SERPL-MCNC: 29 MG/DL (ref 6–20)
CALCIUM SERPL-MCNC: 8.9 MG/DL (ref 8.7–10.5)
CHLORIDE SERPL-SCNC: 104 MMOL/L (ref 95–110)
CO2 SERPL-SCNC: 30 MMOL/L (ref 23–29)
CODING SYSTEM: NORMAL
CODING SYSTEM: NORMAL
CREAT SERPL-MCNC: 0.9 MG/DL (ref 0.5–1.4)
DIFFERENTIAL METHOD: ABNORMAL
DISPENSE STATUS: NORMAL
DISPENSE STATUS: NORMAL
EOSINOPHIL # BLD AUTO: 0.2 K/UL (ref 0–0.5)
EOSINOPHIL # BLD AUTO: 0.3 K/UL (ref 0–0.5)
EOSINOPHIL # BLD AUTO: 0.4 K/UL (ref 0–0.5)
EOSINOPHIL # BLD AUTO: 0.4 K/UL (ref 0–0.5)
EOSINOPHIL NFR BLD: 2.9 % (ref 0–8)
EOSINOPHIL NFR BLD: 3.1 % (ref 0–8)
EOSINOPHIL NFR BLD: 4 % (ref 0–8)
EOSINOPHIL NFR BLD: 4 % (ref 0–8)
ERYTHROCYTE [DISTWIDTH] IN BLOOD BY AUTOMATED COUNT: 15.8 % (ref 11.5–14.5)
ERYTHROCYTE [DISTWIDTH] IN BLOOD BY AUTOMATED COUNT: 15.9 % (ref 11.5–14.5)
ERYTHROCYTE [DISTWIDTH] IN BLOOD BY AUTOMATED COUNT: 15.9 % (ref 11.5–14.5)
ERYTHROCYTE [DISTWIDTH] IN BLOOD BY AUTOMATED COUNT: 16 % (ref 11.5–14.5)
EST. GFR  (AFRICAN AMERICAN): >60 ML/MIN/1.73 M^2
EST. GFR  (NON AFRICAN AMERICAN): >60 ML/MIN/1.73 M^2
FOLATE SERPL-MCNC: 11.8 NG/ML (ref 4–24)
GLUCOSE SERPL-MCNC: 105 MG/DL (ref 70–110)
HCT VFR BLD AUTO: 18.9 % (ref 40–54)
HCT VFR BLD AUTO: 23.3 % (ref 40–54)
HCT VFR BLD AUTO: 23.3 % (ref 40–54)
HCT VFR BLD AUTO: 24.4 % (ref 40–54)
HGB BLD-MCNC: 5.8 G/DL (ref 14–18)
HGB BLD-MCNC: 7.4 G/DL (ref 14–18)
HGB BLD-MCNC: 7.4 G/DL (ref 14–18)
HGB BLD-MCNC: 7.5 G/DL (ref 14–18)
HYPOCHROMIA BLD QL SMEAR: ABNORMAL
IMM GRANULOCYTES # BLD AUTO: 0.03 K/UL (ref 0–0.04)
IMM GRANULOCYTES # BLD AUTO: 0.05 K/UL (ref 0–0.04)
IMM GRANULOCYTES NFR BLD AUTO: 0.3 % (ref 0–0.5)
IMM GRANULOCYTES NFR BLD AUTO: 0.6 % (ref 0–0.5)
LYMPHOCYTES # BLD AUTO: 1.6 K/UL (ref 1–4.8)
LYMPHOCYTES # BLD AUTO: 1.8 K/UL (ref 1–4.8)
LYMPHOCYTES # BLD AUTO: 2.1 K/UL (ref 1–4.8)
LYMPHOCYTES # BLD AUTO: 2.1 K/UL (ref 1–4.8)
LYMPHOCYTES NFR BLD: 19.1 % (ref 18–48)
LYMPHOCYTES NFR BLD: 19.6 % (ref 18–48)
LYMPHOCYTES NFR BLD: 23.1 % (ref 18–48)
LYMPHOCYTES NFR BLD: 23.1 % (ref 18–48)
MCH RBC QN AUTO: 26.9 PG (ref 27–31)
MCH RBC QN AUTO: 27.7 PG (ref 27–31)
MCH RBC QN AUTO: 27.7 PG (ref 27–31)
MCH RBC QN AUTO: 27.8 PG (ref 27–31)
MCHC RBC AUTO-ENTMCNC: 30.7 G/DL (ref 32–36)
MCHC RBC AUTO-ENTMCNC: 30.7 G/DL (ref 32–36)
MCHC RBC AUTO-ENTMCNC: 31.8 G/DL (ref 32–36)
MCHC RBC AUTO-ENTMCNC: 31.8 G/DL (ref 32–36)
MCV RBC AUTO: 87 FL (ref 82–98)
MCV RBC AUTO: 87 FL (ref 82–98)
MCV RBC AUTO: 88 FL (ref 82–98)
MCV RBC AUTO: 90 FL (ref 82–98)
MONOCYTES # BLD AUTO: 0.6 K/UL (ref 0.3–1)
MONOCYTES # BLD AUTO: 0.7 K/UL (ref 0.3–1)
MONOCYTES # BLD AUTO: 0.8 K/UL (ref 0.3–1)
MONOCYTES # BLD AUTO: 0.8 K/UL (ref 0.3–1)
MONOCYTES NFR BLD: 7.5 % (ref 4–15)
MONOCYTES NFR BLD: 7.7 % (ref 4–15)
MONOCYTES NFR BLD: 8.8 % (ref 4–15)
MONOCYTES NFR BLD: 8.8 % (ref 4–15)
NEUTROPHILS # BLD AUTO: 5.7 K/UL (ref 1.8–7.7)
NEUTROPHILS # BLD AUTO: 5.7 K/UL (ref 1.8–7.7)
NEUTROPHILS # BLD AUTO: 5.8 K/UL (ref 1.8–7.7)
NEUTROPHILS # BLD AUTO: 6.2 K/UL (ref 1.8–7.7)
NEUTROPHILS NFR BLD: 62.9 % (ref 38–73)
NEUTROPHILS NFR BLD: 62.9 % (ref 38–73)
NEUTROPHILS NFR BLD: 69 % (ref 38–73)
NEUTROPHILS NFR BLD: 69.5 % (ref 38–73)
NRBC BLD-RTO: 0 /100 WBC
NUM UNITS TRANS PACKED RBC: NORMAL
PLATELET # BLD AUTO: 134 K/UL (ref 150–450)
PLATELET # BLD AUTO: 140 K/UL (ref 150–450)
PLATELET # BLD AUTO: 152 K/UL (ref 150–450)
PLATELET # BLD AUTO: 152 K/UL (ref 150–450)
PLATELET BLD QL SMEAR: ABNORMAL
PMV BLD AUTO: 13.5 FL (ref 9.2–12.9)
PMV BLD AUTO: 13.9 FL (ref 9.2–12.9)
PMV BLD AUTO: 13.9 FL (ref 9.2–12.9)
PMV BLD AUTO: ABNORMAL FL (ref 9.2–12.9)
POIKILOCYTOSIS BLD QL SMEAR: SLIGHT
POLYCHROMASIA BLD QL SMEAR: ABNORMAL
POTASSIUM SERPL-SCNC: 3.4 MMOL/L (ref 3.5–5.1)
RBC # BLD AUTO: 2.16 M/UL (ref 4.6–6.2)
RBC # BLD AUTO: 2.67 M/UL (ref 4.6–6.2)
RBC # BLD AUTO: 2.67 M/UL (ref 4.6–6.2)
RBC # BLD AUTO: 2.7 M/UL (ref 4.6–6.2)
SODIUM SERPL-SCNC: 139 MMOL/L (ref 136–145)
TRANS ERYTHROCYTES VOL PATIENT: NORMAL ML
VIT B12 SERPL-MCNC: 356 PG/ML (ref 210–950)
WBC # BLD AUTO: 8.32 K/UL (ref 3.9–12.7)
WBC # BLD AUTO: 8.98 K/UL (ref 3.9–12.7)
WBC # BLD AUTO: 8.99 K/UL (ref 3.9–12.7)
WBC # BLD AUTO: 8.99 K/UL (ref 3.9–12.7)

## 2021-12-04 PROCEDURE — 99223 PR INITIAL HOSPITAL CARE,LEVL III: ICD-10-PCS | Mod: ,,, | Performed by: INTERNAL MEDICINE

## 2021-12-04 PROCEDURE — 80048 BASIC METABOLIC PNL TOTAL CA: CPT | Performed by: HOSPITALIST

## 2021-12-04 PROCEDURE — C9113 INJ PANTOPRAZOLE SODIUM, VIA: HCPCS | Performed by: HOSPITALIST

## 2021-12-04 PROCEDURE — 36415 COLL VENOUS BLD VENIPUNCTURE: CPT | Performed by: HOSPITALIST

## 2021-12-04 PROCEDURE — 21400001 HC TELEMETRY ROOM

## 2021-12-04 PROCEDURE — P9016 RBC LEUKOCYTES REDUCED: HCPCS | Performed by: INTERNAL MEDICINE

## 2021-12-04 PROCEDURE — 99223 1ST HOSP IP/OBS HIGH 75: CPT | Mod: ,,, | Performed by: INTERNAL MEDICINE

## 2021-12-04 PROCEDURE — 63600175 PHARM REV CODE 636 W HCPCS: Performed by: HOSPITALIST

## 2021-12-04 PROCEDURE — 25000003 PHARM REV CODE 250: Performed by: HOSPITALIST

## 2021-12-04 PROCEDURE — 36430 TRANSFUSION BLD/BLD COMPNT: CPT

## 2021-12-04 PROCEDURE — P9021 RED BLOOD CELLS UNIT: HCPCS | Performed by: HOSPITALIST

## 2021-12-04 PROCEDURE — 85025 COMPLETE CBC W/AUTO DIFF WBC: CPT | Performed by: HOSPITALIST

## 2021-12-04 RX ORDER — POTASSIUM CHLORIDE 750 MG/1
30 TABLET, EXTENDED RELEASE ORAL ONCE
Status: COMPLETED | OUTPATIENT
Start: 2021-12-04 | End: 2021-12-04

## 2021-12-04 RX ORDER — LANOLIN ALCOHOL/MO/W.PET/CERES
1 CREAM (GRAM) TOPICAL DAILY
Status: DISCONTINUED | OUTPATIENT
Start: 2021-12-04 | End: 2021-12-06 | Stop reason: HOSPADM

## 2021-12-04 RX ORDER — HYDROCODONE BITARTRATE AND ACETAMINOPHEN 500; 5 MG/1; MG/1
TABLET ORAL
Status: DISCONTINUED | OUTPATIENT
Start: 2021-12-04 | End: 2021-12-06 | Stop reason: HOSPADM

## 2021-12-04 RX ADMIN — GABAPENTIN 100 MG: 100 CAPSULE ORAL at 09:12

## 2021-12-04 RX ADMIN — POTASSIUM CHLORIDE 30 MEQ: 750 TABLET, EXTENDED RELEASE ORAL at 09:12

## 2021-12-04 RX ADMIN — PANTOPRAZOLE SODIUM 40 MG: 40 INJECTION, POWDER, FOR SOLUTION INTRAVENOUS at 09:12

## 2021-12-04 RX ADMIN — GABAPENTIN 100 MG: 100 CAPSULE ORAL at 03:12

## 2021-12-04 RX ADMIN — FERROUS SULFATE TAB 325 MG (65 MG ELEMENTAL FE) 1 EACH: 325 (65 FE) TAB at 09:12

## 2021-12-05 LAB
ANION GAP SERPL CALC-SCNC: 6 MMOL/L (ref 8–16)
BASOPHILS # BLD AUTO: 0.03 K/UL (ref 0–0.2)
BASOPHILS NFR BLD: 0.4 % (ref 0–1.9)
BLD PROD TYP BPU: NORMAL
BLOOD UNIT EXPIRATION DATE: NORMAL
BLOOD UNIT TYPE CODE: 7300
BLOOD UNIT TYPE: NORMAL
BUN SERPL-MCNC: 10 MG/DL (ref 6–20)
CALCIUM SERPL-MCNC: 8.5 MG/DL (ref 8.7–10.5)
CHLORIDE SERPL-SCNC: 107 MMOL/L (ref 95–110)
CO2 SERPL-SCNC: 27 MMOL/L (ref 23–29)
CODING SYSTEM: NORMAL
CREAT SERPL-MCNC: 0.8 MG/DL (ref 0.5–1.4)
DIFFERENTIAL METHOD: ABNORMAL
DISPENSE STATUS: NORMAL
EOSINOPHIL # BLD AUTO: 0.3 K/UL (ref 0–0.5)
EOSINOPHIL NFR BLD: 3.9 % (ref 0–8)
ERYTHROCYTE [DISTWIDTH] IN BLOOD BY AUTOMATED COUNT: 15.9 % (ref 11.5–14.5)
EST. GFR  (AFRICAN AMERICAN): >60 ML/MIN/1.73 M^2
EST. GFR  (NON AFRICAN AMERICAN): >60 ML/MIN/1.73 M^2
GLUCOSE SERPL-MCNC: 103 MG/DL (ref 70–110)
HCT VFR BLD AUTO: 22.4 % (ref 40–54)
HGB BLD-MCNC: 6.9 G/DL (ref 14–18)
IMM GRANULOCYTES # BLD AUTO: 0.04 K/UL (ref 0–0.04)
IMM GRANULOCYTES NFR BLD AUTO: 0.5 % (ref 0–0.5)
LYMPHOCYTES # BLD AUTO: 2.1 K/UL (ref 1–4.8)
LYMPHOCYTES NFR BLD: 26.6 % (ref 18–48)
MCH RBC QN AUTO: 27.6 PG (ref 27–31)
MCHC RBC AUTO-ENTMCNC: 30.8 G/DL (ref 32–36)
MCV RBC AUTO: 90 FL (ref 82–98)
MONOCYTES # BLD AUTO: 0.7 K/UL (ref 0.3–1)
MONOCYTES NFR BLD: 8.9 % (ref 4–15)
NEUTROPHILS # BLD AUTO: 4.6 K/UL (ref 1.8–7.7)
NEUTROPHILS NFR BLD: 59.7 % (ref 38–73)
NRBC BLD-RTO: 0 /100 WBC
PLATELET # BLD AUTO: 133 K/UL (ref 150–450)
PMV BLD AUTO: 13.8 FL (ref 9.2–12.9)
POTASSIUM SERPL-SCNC: 3.5 MMOL/L (ref 3.5–5.1)
RBC # BLD AUTO: 2.5 M/UL (ref 4.6–6.2)
SODIUM SERPL-SCNC: 140 MMOL/L (ref 136–145)
TRANS ERYTHROCYTES VOL PATIENT: NORMAL ML
WBC # BLD AUTO: 7.71 K/UL (ref 3.9–12.7)

## 2021-12-05 PROCEDURE — P9021 RED BLOOD CELLS UNIT: HCPCS | Performed by: HOSPITALIST

## 2021-12-05 PROCEDURE — 80048 BASIC METABOLIC PNL TOTAL CA: CPT | Performed by: HOSPITALIST

## 2021-12-05 PROCEDURE — 25000003 PHARM REV CODE 250: Performed by: HOSPITALIST

## 2021-12-05 PROCEDURE — 21400001 HC TELEMETRY ROOM

## 2021-12-05 PROCEDURE — 85025 COMPLETE CBC W/AUTO DIFF WBC: CPT | Performed by: HOSPITALIST

## 2021-12-05 PROCEDURE — 63600175 PHARM REV CODE 636 W HCPCS: Performed by: HOSPITALIST

## 2021-12-05 PROCEDURE — 99231 PR SUBSEQUENT HOSPITAL CARE,LEVL I: ICD-10-PCS | Mod: ,,, | Performed by: INTERNAL MEDICINE

## 2021-12-05 PROCEDURE — 36430 TRANSFUSION BLD/BLD COMPNT: CPT

## 2021-12-05 PROCEDURE — 99231 SBSQ HOSP IP/OBS SF/LOW 25: CPT | Mod: ,,, | Performed by: INTERNAL MEDICINE

## 2021-12-05 PROCEDURE — C9113 INJ PANTOPRAZOLE SODIUM, VIA: HCPCS | Performed by: HOSPITALIST

## 2021-12-05 PROCEDURE — 36415 COLL VENOUS BLD VENIPUNCTURE: CPT | Performed by: HOSPITALIST

## 2021-12-05 RX ORDER — HYDROCODONE BITARTRATE AND ACETAMINOPHEN 500; 5 MG/1; MG/1
TABLET ORAL
Status: DISCONTINUED | OUTPATIENT
Start: 2021-12-05 | End: 2021-12-06 | Stop reason: HOSPADM

## 2021-12-05 RX ADMIN — SODIUM CHLORIDE: 0.9 INJECTION, SOLUTION INTRAVENOUS at 06:12

## 2021-12-05 RX ADMIN — GABAPENTIN 100 MG: 100 CAPSULE ORAL at 08:12

## 2021-12-05 RX ADMIN — FERROUS SULFATE TAB 325 MG (65 MG ELEMENTAL FE) 1 EACH: 325 (65 FE) TAB at 09:12

## 2021-12-05 RX ADMIN — SODIUM CHLORIDE: 0.9 INJECTION, SOLUTION INTRAVENOUS at 08:12

## 2021-12-05 RX ADMIN — GABAPENTIN 100 MG: 100 CAPSULE ORAL at 09:12

## 2021-12-05 RX ADMIN — PANTOPRAZOLE SODIUM 40 MG: 40 INJECTION, POWDER, FOR SOLUTION INTRAVENOUS at 09:12

## 2021-12-05 RX ADMIN — PANTOPRAZOLE SODIUM 40 MG: 40 INJECTION, POWDER, FOR SOLUTION INTRAVENOUS at 08:12

## 2021-12-05 RX ADMIN — GABAPENTIN 100 MG: 100 CAPSULE ORAL at 03:12

## 2021-12-06 ENCOUNTER — ANESTHESIA EVENT (OUTPATIENT)
Dept: ENDOSCOPY | Facility: HOSPITAL | Age: 61
DRG: 378 | End: 2021-12-06
Payer: MEDICARE

## 2021-12-06 ENCOUNTER — ANESTHESIA (OUTPATIENT)
Dept: ENDOSCOPY | Facility: HOSPITAL | Age: 61
DRG: 378 | End: 2021-12-06
Payer: MEDICARE

## 2021-12-06 VITALS
WEIGHT: 315 LBS | OXYGEN SATURATION: 98 % | BODY MASS INDEX: 40.43 KG/M2 | HEART RATE: 69 BPM | RESPIRATION RATE: 18 BRPM | DIASTOLIC BLOOD PRESSURE: 82 MMHG | TEMPERATURE: 98 F | HEIGHT: 74 IN | SYSTOLIC BLOOD PRESSURE: 157 MMHG

## 2021-12-06 DIAGNOSIS — K25.9 GASTRIC ULCER, UNSPECIFIED CHRONICITY, UNSPECIFIED WHETHER GASTRIC ULCER HEMORRHAGE OR PERFORATION PRESENT: Primary | ICD-10-CM

## 2021-12-06 PROBLEM — E87.6 HYPOKALEMIA: Status: RESOLVED | Noted: 2021-12-04 | Resolved: 2021-12-06

## 2021-12-06 PROBLEM — K92.2 UPPER GI BLEEDING: Status: RESOLVED | Noted: 2021-12-03 | Resolved: 2021-12-06

## 2021-12-06 PROBLEM — I95.9 HYPOTENSION: Status: RESOLVED | Noted: 2021-12-03 | Resolved: 2021-12-06

## 2021-12-06 LAB
ANION GAP SERPL CALC-SCNC: 6 MMOL/L (ref 8–16)
BASOPHILS # BLD AUTO: 0.03 K/UL (ref 0–0.2)
BASOPHILS NFR BLD: 0.4 % (ref 0–1.9)
BUN SERPL-MCNC: 8 MG/DL (ref 6–20)
CALCIUM SERPL-MCNC: 8.6 MG/DL (ref 8.7–10.5)
CHLORIDE SERPL-SCNC: 110 MMOL/L (ref 95–110)
CO2 SERPL-SCNC: 25 MMOL/L (ref 23–29)
CREAT SERPL-MCNC: 0.8 MG/DL (ref 0.5–1.4)
DIFFERENTIAL METHOD: ABNORMAL
EOSINOPHIL # BLD AUTO: 0.3 K/UL (ref 0–0.5)
EOSINOPHIL NFR BLD: 4.1 % (ref 0–8)
ERYTHROCYTE [DISTWIDTH] IN BLOOD BY AUTOMATED COUNT: 15.8 % (ref 11.5–14.5)
EST. GFR  (AFRICAN AMERICAN): >60 ML/MIN/1.73 M^2
EST. GFR  (NON AFRICAN AMERICAN): >60 ML/MIN/1.73 M^2
GLUCOSE SERPL-MCNC: 102 MG/DL (ref 70–110)
HCT VFR BLD AUTO: 24.2 % (ref 40–54)
HGB BLD-MCNC: 7.5 G/DL (ref 14–18)
IMM GRANULOCYTES # BLD AUTO: 0.04 K/UL (ref 0–0.04)
IMM GRANULOCYTES NFR BLD AUTO: 0.5 % (ref 0–0.5)
LYMPHOCYTES # BLD AUTO: 2 K/UL (ref 1–4.8)
LYMPHOCYTES NFR BLD: 24 % (ref 18–48)
MCH RBC QN AUTO: 27.1 PG (ref 27–31)
MCHC RBC AUTO-ENTMCNC: 31 G/DL (ref 32–36)
MCV RBC AUTO: 87 FL (ref 82–98)
MONOCYTES # BLD AUTO: 0.7 K/UL (ref 0.3–1)
MONOCYTES NFR BLD: 7.9 % (ref 4–15)
NEUTROPHILS # BLD AUTO: 5.2 K/UL (ref 1.8–7.7)
NEUTROPHILS NFR BLD: 63.1 % (ref 38–73)
NRBC BLD-RTO: 0 /100 WBC
PLATELET # BLD AUTO: 149 K/UL (ref 150–450)
PMV BLD AUTO: 12.6 FL (ref 9.2–12.9)
POTASSIUM SERPL-SCNC: 3.6 MMOL/L (ref 3.5–5.1)
RBC # BLD AUTO: 2.77 M/UL (ref 4.6–6.2)
SODIUM SERPL-SCNC: 141 MMOL/L (ref 136–145)
WBC # BLD AUTO: 8.2 K/UL (ref 3.9–12.7)

## 2021-12-06 PROCEDURE — 43239 PR EGD, FLEX, W/BIOPSY, SGL/MULTI: ICD-10-PCS | Mod: 59,,, | Performed by: INTERNAL MEDICINE

## 2021-12-06 PROCEDURE — D9220A PRA ANESTHESIA: Mod: CRNA,,, | Performed by: NURSE ANESTHETIST, CERTIFIED REGISTERED

## 2021-12-06 PROCEDURE — D9220A PRA ANESTHESIA: ICD-10-PCS | Mod: ANES,,, | Performed by: ANESTHESIOLOGY

## 2021-12-06 PROCEDURE — 63600175 PHARM REV CODE 636 W HCPCS: Performed by: NURSE ANESTHETIST, CERTIFIED REGISTERED

## 2021-12-06 PROCEDURE — 25000003 PHARM REV CODE 250: Performed by: HOSPITALIST

## 2021-12-06 PROCEDURE — 63600175 PHARM REV CODE 636 W HCPCS: Performed by: HOSPITALIST

## 2021-12-06 PROCEDURE — 88305 TISSUE EXAM BY PATHOLOGIST: CPT | Performed by: PATHOLOGY

## 2021-12-06 PROCEDURE — D9220A PRA ANESTHESIA: ICD-10-PCS | Mod: CRNA,,, | Performed by: NURSE ANESTHETIST, CERTIFIED REGISTERED

## 2021-12-06 PROCEDURE — 43239 EGD BIOPSY SINGLE/MULTIPLE: CPT | Mod: 59,,, | Performed by: INTERNAL MEDICINE

## 2021-12-06 PROCEDURE — 25000003 PHARM REV CODE 250: Performed by: ANESTHESIOLOGY

## 2021-12-06 PROCEDURE — 27201038 HC PROBE, BI-POLAR: Performed by: INTERNAL MEDICINE

## 2021-12-06 PROCEDURE — 88305 TISSUE EXAM BY PATHOLOGIST: CPT | Mod: 26,,, | Performed by: PATHOLOGY

## 2021-12-06 PROCEDURE — 43239 EGD BIOPSY SINGLE/MULTIPLE: CPT | Performed by: INTERNAL MEDICINE

## 2021-12-06 PROCEDURE — 43270 PR EGD, FLEX, W/ABLATION, TUMOR/POLYP/LESION(S), W/ DILATION: ICD-10-PCS | Mod: ,,, | Performed by: INTERNAL MEDICINE

## 2021-12-06 PROCEDURE — 25000003 PHARM REV CODE 250: Performed by: INTERNAL MEDICINE

## 2021-12-06 PROCEDURE — 94761 N-INVAS EAR/PLS OXIMETRY MLT: CPT

## 2021-12-06 PROCEDURE — 80048 BASIC METABOLIC PNL TOTAL CA: CPT | Performed by: HOSPITALIST

## 2021-12-06 PROCEDURE — 37000009 HC ANESTHESIA EA ADD 15 MINS: Performed by: INTERNAL MEDICINE

## 2021-12-06 PROCEDURE — C9113 INJ PANTOPRAZOLE SODIUM, VIA: HCPCS | Performed by: HOSPITALIST

## 2021-12-06 PROCEDURE — 36415 COLL VENOUS BLD VENIPUNCTURE: CPT | Performed by: HOSPITALIST

## 2021-12-06 PROCEDURE — 27201012 HC FORCEPS, HOT/COLD, DISP: Performed by: INTERNAL MEDICINE

## 2021-12-06 PROCEDURE — D9220A PRA ANESTHESIA: Mod: ANES,,, | Performed by: ANESTHESIOLOGY

## 2021-12-06 PROCEDURE — 88305 TISSUE EXAM BY PATHOLOGIST: ICD-10-PCS | Mod: 26,,, | Performed by: PATHOLOGY

## 2021-12-06 PROCEDURE — 43270 EGD LESION ABLATION: CPT | Mod: ,,, | Performed by: INTERNAL MEDICINE

## 2021-12-06 PROCEDURE — 85025 COMPLETE CBC W/AUTO DIFF WBC: CPT | Performed by: HOSPITALIST

## 2021-12-06 PROCEDURE — 37000008 HC ANESTHESIA 1ST 15 MINUTES: Performed by: INTERNAL MEDICINE

## 2021-12-06 PROCEDURE — 63600175 PHARM REV CODE 636 W HCPCS: Performed by: NURSE PRACTITIONER

## 2021-12-06 RX ORDER — LANOLIN ALCOHOL/MO/W.PET/CERES
1 CREAM (GRAM) TOPICAL DAILY
Qty: 30 TABLET | Refills: 0 | Status: SHIPPED | OUTPATIENT
Start: 2021-12-07 | End: 2022-01-06

## 2021-12-06 RX ORDER — HYDRALAZINE HYDROCHLORIDE 20 MG/ML
10 INJECTION INTRAMUSCULAR; INTRAVENOUS EVERY 8 HOURS PRN
Status: DISCONTINUED | OUTPATIENT
Start: 2021-12-06 | End: 2021-12-06 | Stop reason: HOSPADM

## 2021-12-06 RX ORDER — PROPOFOL 10 MG/ML
INJECTION, EMULSION INTRAVENOUS
Status: DISCONTINUED
Start: 2021-12-06 | End: 2021-12-06 | Stop reason: HOSPADM

## 2021-12-06 RX ORDER — SODIUM CHLORIDE 9 MG/ML
INJECTION, SOLUTION INTRAVENOUS ONCE
Status: COMPLETED | OUTPATIENT
Start: 2021-12-06 | End: 2021-12-06

## 2021-12-06 RX ORDER — PANTOPRAZOLE SODIUM 40 MG/1
40 TABLET, DELAYED RELEASE ORAL 2 TIMES DAILY
Qty: 30 TABLET | Refills: 0 | Status: SHIPPED | OUTPATIENT
Start: 2021-12-06 | End: 2022-01-05

## 2021-12-06 RX ORDER — PROPOFOL 10 MG/ML
VIAL (ML) INTRAVENOUS
Status: DISCONTINUED | OUTPATIENT
Start: 2021-12-06 | End: 2021-12-06

## 2021-12-06 RX ORDER — LIDOCAINE HYDROCHLORIDE 20 MG/ML
INJECTION, SOLUTION EPIDURAL; INFILTRATION; INTRACAUDAL; PERINEURAL
Status: DISCONTINUED
Start: 2021-12-06 | End: 2021-12-06 | Stop reason: HOSPADM

## 2021-12-06 RX ORDER — LOSARTAN POTASSIUM 25 MG/1
50 TABLET ORAL DAILY
Status: DISCONTINUED | OUTPATIENT
Start: 2021-12-06 | End: 2021-12-06 | Stop reason: HOSPADM

## 2021-12-06 RX ADMIN — SODIUM CHLORIDE: 0.9 INJECTION, SOLUTION INTRAVENOUS at 10:12

## 2021-12-06 RX ADMIN — GABAPENTIN 100 MG: 100 CAPSULE ORAL at 12:12

## 2021-12-06 RX ADMIN — PROPOFOL 40 MG: 10 INJECTION, EMULSION INTRAVENOUS at 10:12

## 2021-12-06 RX ADMIN — PROPOFOL 60 MG: 10 INJECTION, EMULSION INTRAVENOUS at 10:12

## 2021-12-06 RX ADMIN — PANTOPRAZOLE SODIUM 40 MG: 40 INJECTION, POWDER, FOR SOLUTION INTRAVENOUS at 12:12

## 2021-12-06 RX ADMIN — LOSARTAN POTASSIUM 50 MG: 25 TABLET, FILM COATED ORAL at 05:12

## 2021-12-06 RX ADMIN — HYDRALAZINE HYDROCHLORIDE 10 MG: 20 INJECTION, SOLUTION INTRAMUSCULAR; INTRAVENOUS at 12:12

## 2021-12-06 RX ADMIN — PROPOFOL 100 MG: 10 INJECTION, EMULSION INTRAVENOUS at 10:12

## 2021-12-06 RX ADMIN — FERROUS SULFATE TAB 325 MG (65 MG ELEMENTAL FE) 1 EACH: 325 (65 FE) TAB at 12:12

## 2021-12-07 ENCOUNTER — TELEPHONE (OUTPATIENT)
Dept: GASTROENTEROLOGY | Facility: CLINIC | Age: 61
End: 2021-12-07
Payer: MEDICARE

## 2021-12-07 LAB
FINAL PATHOLOGIC DIAGNOSIS: NORMAL
GROSS: NORMAL
Lab: NORMAL

## 2022-01-10 ENCOUNTER — TELEPHONE (OUTPATIENT)
Dept: GASTROENTEROLOGY | Facility: CLINIC | Age: 62
End: 2022-01-10
Payer: MEDICARE

## 2022-01-10 NOTE — TELEPHONE ENCOUNTER
----- Message from Divya Gonzalez sent at 1/10/2022  2:26 PM CST -----  Regarding: Appointment  Contact: Ms Baron/   Spouse  [phone]  Type: Appointment Request    Name of Caller:  Ms Baron  states patient was seen in Hospital.  Patient advised to follow up with  Dr Pritesh Snow patient experiencing gas and back pain need appointment for Tomorrow or Wednesday.  When is the first available appointment?  Symptoms:  Best Call Back Number:[phone]  Additional Information:

## 2022-01-10 NOTE — TELEPHONE ENCOUNTER
Pt requesting an appointment on the [facility] with Any provider   Pt has gas and back pain  Dr. Edy Nolan spoke with pt and offered first available at [facility]   Pt request [facility]  Please call pt

## 2022-01-25 ENCOUNTER — LAB VISIT (OUTPATIENT)
Dept: LAB | Facility: HOSPITAL | Age: 62
End: 2022-01-25
Attending: INTERNAL MEDICINE
Payer: MEDICARE

## 2022-01-25 ENCOUNTER — OFFICE VISIT (OUTPATIENT)
Dept: GASTROENTEROLOGY | Facility: CLINIC | Age: 62
End: 2022-01-25
Payer: MEDICARE

## 2022-01-25 VITALS
SYSTOLIC BLOOD PRESSURE: 158 MMHG | DIASTOLIC BLOOD PRESSURE: 98 MMHG | HEART RATE: 94 BPM | HEIGHT: 74 IN | BODY MASS INDEX: 44.95 KG/M2

## 2022-01-25 DIAGNOSIS — D64.9 ANEMIA, UNSPECIFIED TYPE: Primary | ICD-10-CM

## 2022-01-25 DIAGNOSIS — Z09 HOSPITAL DISCHARGE FOLLOW-UP: ICD-10-CM

## 2022-01-25 DIAGNOSIS — D64.9 ANEMIA, UNSPECIFIED TYPE: ICD-10-CM

## 2022-01-25 LAB
BASOPHILS # BLD AUTO: 0.04 K/UL (ref 0–0.2)
BASOPHILS NFR BLD: 0.6 % (ref 0–1.9)
DIFFERENTIAL METHOD: ABNORMAL
EOSINOPHIL # BLD AUTO: 0.4 K/UL (ref 0–0.5)
EOSINOPHIL NFR BLD: 6.1 % (ref 0–8)
ERYTHROCYTE [DISTWIDTH] IN BLOOD BY AUTOMATED COUNT: 22.8 % (ref 11.5–14.5)
HCT VFR BLD AUTO: 40.7 % (ref 40–54)
HGB BLD-MCNC: 11.6 G/DL (ref 14–18)
IMM GRANULOCYTES # BLD AUTO: 0.02 K/UL (ref 0–0.04)
IMM GRANULOCYTES NFR BLD AUTO: 0.3 % (ref 0–0.5)
LYMPHOCYTES # BLD AUTO: 1.6 K/UL (ref 1–4.8)
LYMPHOCYTES NFR BLD: 22 % (ref 18–48)
MCH RBC QN AUTO: 21.4 PG (ref 27–31)
MCHC RBC AUTO-ENTMCNC: 28.5 G/DL (ref 32–36)
MCV RBC AUTO: 75 FL (ref 82–98)
MONOCYTES # BLD AUTO: 0.6 K/UL (ref 0.3–1)
MONOCYTES NFR BLD: 8.6 % (ref 4–15)
NEUTROPHILS # BLD AUTO: 4.4 K/UL (ref 1.8–7.7)
NEUTROPHILS NFR BLD: 62.4 % (ref 38–73)
NRBC BLD-RTO: 0 /100 WBC
PLATELET # BLD AUTO: 168 K/UL (ref 150–450)
PMV BLD AUTO: ABNORMAL FL (ref 9.2–12.9)
RBC # BLD AUTO: 5.42 M/UL (ref 4.6–6.2)
WBC # BLD AUTO: 7.06 K/UL (ref 3.9–12.7)

## 2022-01-25 PROCEDURE — 3077F PR MOST RECENT SYSTOLIC BLOOD PRESSURE >= 140 MM HG: ICD-10-PCS | Mod: CPTII,S$GLB,, | Performed by: INTERNAL MEDICINE

## 2022-01-25 PROCEDURE — 3077F SYST BP >= 140 MM HG: CPT | Mod: CPTII,S$GLB,, | Performed by: INTERNAL MEDICINE

## 2022-01-25 PROCEDURE — 99999 PR PBB SHADOW E&M-EST. PATIENT-LVL III: ICD-10-PCS | Mod: PBBFAC,,, | Performed by: INTERNAL MEDICINE

## 2022-01-25 PROCEDURE — 3080F PR MOST RECENT DIASTOLIC BLOOD PRESSURE >= 90 MM HG: ICD-10-PCS | Mod: CPTII,S$GLB,, | Performed by: INTERNAL MEDICINE

## 2022-01-25 PROCEDURE — 36415 COLL VENOUS BLD VENIPUNCTURE: CPT | Performed by: INTERNAL MEDICINE

## 2022-01-25 PROCEDURE — 99214 OFFICE O/P EST MOD 30 MIN: CPT | Mod: S$GLB,,, | Performed by: INTERNAL MEDICINE

## 2022-01-25 PROCEDURE — 1159F PR MEDICATION LIST DOCUMENTED IN MEDICAL RECORD: ICD-10-PCS | Mod: CPTII,S$GLB,, | Performed by: INTERNAL MEDICINE

## 2022-01-25 PROCEDURE — 85025 COMPLETE CBC W/AUTO DIFF WBC: CPT | Performed by: INTERNAL MEDICINE

## 2022-01-25 PROCEDURE — 3008F BODY MASS INDEX DOCD: CPT | Mod: CPTII,S$GLB,, | Performed by: INTERNAL MEDICINE

## 2022-01-25 PROCEDURE — 3080F DIAST BP >= 90 MM HG: CPT | Mod: CPTII,S$GLB,, | Performed by: INTERNAL MEDICINE

## 2022-01-25 PROCEDURE — 3008F PR BODY MASS INDEX (BMI) DOCUMENTED: ICD-10-PCS | Mod: CPTII,S$GLB,, | Performed by: INTERNAL MEDICINE

## 2022-01-25 PROCEDURE — 1159F MED LIST DOCD IN RCRD: CPT | Mod: CPTII,S$GLB,, | Performed by: INTERNAL MEDICINE

## 2022-01-25 PROCEDURE — 99999 PR PBB SHADOW E&M-EST. PATIENT-LVL III: CPT | Mod: PBBFAC,,, | Performed by: INTERNAL MEDICINE

## 2022-01-25 PROCEDURE — 99214 PR OFFICE/OUTPT VISIT, EST, LEVL IV, 30-39 MIN: ICD-10-PCS | Mod: S$GLB,,, | Performed by: INTERNAL MEDICINE

## 2022-01-25 RX ORDER — AMMONIUM LACTATE 12 G/100G
1 CREAM TOPICAL 2 TIMES DAILY
COMMUNITY
Start: 2021-12-26

## 2022-01-25 RX ORDER — FLUTICASONE PROPIONATE 50 MCG
SPRAY, SUSPENSION (ML) NASAL
COMMUNITY
Start: 2022-01-20

## 2022-01-25 RX ORDER — CICLOPIROX 80 MG/ML
SOLUTION TOPICAL
COMMUNITY
Start: 2021-12-26
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
## 2022-03-20 ENCOUNTER — ANESTHESIA EVENT (OUTPATIENT)
Dept: ENDOSCOPY | Facility: HOSPITAL | Age: 62
End: 2022-03-20
Payer: MEDICARE

## 2022-03-21 ENCOUNTER — ANESTHESIA (OUTPATIENT)
Dept: ENDOSCOPY | Facility: HOSPITAL | Age: 62
End: 2022-03-21
Payer: MEDICARE

## 2022-03-21 ENCOUNTER — HOSPITAL ENCOUNTER (OUTPATIENT)
Facility: HOSPITAL | Age: 62
Discharge: HOME | End: 2022-03-21
Attending: INTERNAL MEDICINE | Admitting: INTERNAL MEDICINE
Payer: MEDICARE

## 2022-03-21 VITALS
SYSTOLIC BLOOD PRESSURE: 144 MMHG | OXYGEN SATURATION: 95 % | RESPIRATION RATE: 18 BRPM | TEMPERATURE: 98 F | DIASTOLIC BLOOD PRESSURE: 88 MMHG | HEART RATE: 60 BPM

## 2022-03-21 DIAGNOSIS — Z87.11 HISTORY OF GASTRIC ULCER: Primary | ICD-10-CM

## 2022-03-21 PROCEDURE — 37000008 HC ANESTHESIA 1ST 15 MINUTES: Performed by: INTERNAL MEDICINE

## 2022-03-21 PROCEDURE — 43239 PR EGD, FLEX, W/BIOPSY, SGL/MULTI: ICD-10-PCS | Mod: ,,, | Performed by: INTERNAL MEDICINE

## 2022-03-21 PROCEDURE — D9220A PRA ANESTHESIA: Mod: ANES,,, | Performed by: ANESTHESIOLOGY

## 2022-03-21 PROCEDURE — 37000009 HC ANESTHESIA EA ADD 15 MINS: Performed by: INTERNAL MEDICINE

## 2022-03-21 PROCEDURE — 43239 EGD BIOPSY SINGLE/MULTIPLE: CPT | Performed by: INTERNAL MEDICINE

## 2022-03-21 PROCEDURE — 88305 TISSUE EXAM BY PATHOLOGIST: CPT | Mod: 26,,, | Performed by: PATHOLOGY

## 2022-03-21 PROCEDURE — D9220A PRA ANESTHESIA: ICD-10-PCS | Mod: ANES,,, | Performed by: ANESTHESIOLOGY

## 2022-03-21 PROCEDURE — D9220A PRA ANESTHESIA: Mod: CRNA,,, | Performed by: STUDENT IN AN ORGANIZED HEALTH CARE EDUCATION/TRAINING PROGRAM

## 2022-03-21 PROCEDURE — D9220A PRA ANESTHESIA: ICD-10-PCS | Mod: CRNA,,, | Performed by: STUDENT IN AN ORGANIZED HEALTH CARE EDUCATION/TRAINING PROGRAM

## 2022-03-21 PROCEDURE — 43239 EGD BIOPSY SINGLE/MULTIPLE: CPT | Mod: ,,, | Performed by: INTERNAL MEDICINE

## 2022-03-21 PROCEDURE — 88305 TISSUE EXAM BY PATHOLOGIST: CPT | Performed by: PATHOLOGY

## 2022-03-21 PROCEDURE — 63600175 PHARM REV CODE 636 W HCPCS: Performed by: STUDENT IN AN ORGANIZED HEALTH CARE EDUCATION/TRAINING PROGRAM

## 2022-03-21 PROCEDURE — 27201012 HC FORCEPS, HOT/COLD, DISP: Performed by: INTERNAL MEDICINE

## 2022-03-21 PROCEDURE — 88305 TISSUE EXAM BY PATHOLOGIST: ICD-10-PCS | Mod: 26,,, | Performed by: PATHOLOGY

## 2022-03-21 PROCEDURE — 25000003 PHARM REV CODE 250: Performed by: STUDENT IN AN ORGANIZED HEALTH CARE EDUCATION/TRAINING PROGRAM

## 2022-03-21 RX ORDER — LIDOCAINE HYDROCHLORIDE 10 MG/ML
1 INJECTION, SOLUTION EPIDURAL; INFILTRATION; INTRACAUDAL; PERINEURAL ONCE
Status: DISCONTINUED | OUTPATIENT
Start: 2022-03-21 | End: 2022-03-21 | Stop reason: HOSPADM

## 2022-03-21 RX ORDER — KETAMINE HYDROCHLORIDE 100 MG/ML
INJECTION, SOLUTION INTRAMUSCULAR; INTRAVENOUS
Status: DISCONTINUED | OUTPATIENT
Start: 2022-03-21 | End: 2022-03-21

## 2022-03-21 RX ORDER — PROPOFOL 10 MG/ML
INJECTION, EMULSION INTRAVENOUS
Status: DISCONTINUED
Start: 2022-03-21 | End: 2022-03-21 | Stop reason: HOSPADM

## 2022-03-21 RX ORDER — LIDOCAINE HYDROCHLORIDE 20 MG/ML
INJECTION, SOLUTION EPIDURAL; INFILTRATION; INTRACAUDAL; PERINEURAL
Status: DISCONTINUED
Start: 2022-03-21 | End: 2022-03-21 | Stop reason: HOSPADM

## 2022-03-21 RX ORDER — SODIUM CHLORIDE 9 MG/ML
INJECTION, SOLUTION INTRAVENOUS CONTINUOUS
Status: DISCONTINUED | OUTPATIENT
Start: 2022-03-21 | End: 2022-03-21 | Stop reason: HOSPADM

## 2022-03-21 RX ORDER — PROPOFOL 10 MG/ML
VIAL (ML) INTRAVENOUS
Status: DISCONTINUED | OUTPATIENT
Start: 2022-03-21 | End: 2022-03-21

## 2022-03-21 RX ORDER — LIDOCAINE HYDROCHLORIDE 20 MG/ML
INJECTION INTRAVENOUS
Status: DISCONTINUED | OUTPATIENT
Start: 2022-03-21 | End: 2022-03-21

## 2022-03-21 RX ADMIN — PROPOFOL 30 MG: 10 INJECTION, EMULSION INTRAVENOUS at 02:03

## 2022-03-21 RX ADMIN — KETAMINE HYDROCHLORIDE 5 MG: 100 INJECTION, SOLUTION, CONCENTRATE INTRAMUSCULAR; INTRAVENOUS at 02:03

## 2022-03-21 RX ADMIN — KETAMINE HYDROCHLORIDE 25 MG: 100 INJECTION, SOLUTION, CONCENTRATE INTRAMUSCULAR; INTRAVENOUS at 02:03

## 2022-03-21 RX ADMIN — PROPOFOL 100 MG: 10 INJECTION, EMULSION INTRAVENOUS at 02:03

## 2022-03-21 RX ADMIN — LIDOCAINE HYDROCHLORIDE 100 MG: 20 INJECTION, SOLUTION INTRAVENOUS at 02:03

## 2022-03-21 RX ADMIN — PROPOFOL 20 MG: 10 INJECTION, EMULSION INTRAVENOUS at 02:03

## 2022-03-21 RX ADMIN — SODIUM CHLORIDE: 0.9 INJECTION, SOLUTION INTRAVENOUS at 01:03

## 2022-03-21 RX ADMIN — GLYCOPYRROLATE 0.2 MG: 0.2 INJECTION, SOLUTION INTRAMUSCULAR; INTRAVITREAL at 01:03

## 2022-03-21 RX ADMIN — PROPOFOL 50 MG: 10 INJECTION, EMULSION INTRAVENOUS at 02:03

## 2022-03-21 NOTE — PROVATION PATIENT INSTRUCTIONS
Discharge Summary/Instructions after an Endoscopic Procedure  Patient Name: Dru Baron  Patient MRN: [id number]  Patient YOB: 1960 Monday, March 21, 2022  Zeus Kwon MD  Dear patient,  As a result of recent federal legislation (The Federal Cures Act), you may   receive lab or pathology results from your procedure in your [facility]   account before your physician is able to contact you. Your physician or   their representative will relay the results to you with their   recommendations at their soonest availability.  Thank you,  RESTRICTIONS:  During your procedure today, you received medications for sedation.  These   medications may affect your judgment, balance and coordination.  Therefore,   for 24 hours, you have the following restrictions:   - DO NOT drive a car, operate machinery, make legal/financial decisions,   sign important papers or drink alcohol.    ACTIVITY:  Today: no heavy lifting, straining or running due to procedural   sedation/anesthesia.  The following day: return to full activity including work.  DIET:  Eat and drink normally unless instructed otherwise.     TREATMENT FOR COMMON SIDE EFFECTS:  - Mild abdominal pain, nausea, belching, bloating or excessive gas:  rest,   eat lightly and use a heating pad.  - Sore Throat: treat with throat lozenges and/or gargle with warm salt   water.  - Because air was used during the procedure, expelling large amounts of air   from your rectum or belching is normal.  - If a bowel prep was taken, you may not have a bowel movement for 1-3 days.    This is normal.  SYMPTOMS TO WATCH FOR AND REPORT TO YOUR PHYSICIAN:  1. Abdominal pain or bloating, other than gas cramps.  2. Chest pain.  3. Back pain.  4. Signs of infection such as: chills or fever occurring within 24 hours   after the procedure.  5. Rectal bleeding, which would show as bright red, maroon, or black stools.   (A tablespoon of blood from the rectum is not serious, especially if    hemorrhoids are present.)  6. Vomiting.  7. Weakness or dizziness.  GO DIRECTLY TO THE NEAREST EMERGENCY ROOM IF YOU HAVE ANY OF THE FOLLOWING:      Difficulty breathing              Chills and/or fever over 101 F   Persistent vomiting and/or vomiting blood   Severe abdominal pain   Severe chest pain   Black, tarry stools   Bleeding- more than one tablespoon   Any other symptom or condition that you feel may need urgent attention  Your doctor recommends these additional instructions:  If any biopsies were taken, your doctors clinic will contact you in 1 to 2   weeks with any results.  - Discharge patient to home (ambulatory).   - Patient has a contact number available for emergencies.  The signs and   symptoms of potential delayed complications were discussed with the   patient.  Return to normal activities tomorrow.  Written discharge   instructions were provided to the patient.   - Resume previous diet.   - Continue present medications.   - Return to primary care physician as previously scheduled.   - Await pathology results.  For questions, problems or results please call your physician - Zeus Kwon MD at Work:  [phone].  [facility] can be reached at [phone]     IF A COMPLICATION OR EMERGENCY SITUATION ARISES AND YOU ARE UNABLE TO REACH   YOUR PHYSICIAN - GO DIRECTLY TO THE EMERGENCY ROOM.  Zeus Kwon MD  3/21/2022 2:32:56 PM  This report has been verified and signed electronically.  Dear patient,  As a result of recent federal legislation (The Federal Cures Act), you may   receive lab or pathology results from your procedure in your [facility]   account before your physician is able to contact you. Your physician or   their representative will relay the results to you with their   recommendations at their soonest availability.  Thank you,  PROVATION

## 2022-03-21 NOTE — TRANSFER OF CARE
Anesthesia Transfer of Care Note    Patient: Dru Baron    Procedure(s) Performed: Procedure(s) (LRB):  ESOPHAGOGASTRODUODENOSCOPY (EGD) (N/A)    Patient location: GI    Anesthesia Type: general    Transport from OR: Transported from OR on room air with adequate spontaneous ventilation    Post pain: adequate analgesia    Post assessment: no apparent anesthetic complications    Post vital signs: stable    Level of consciousness: sedated    Nausea/Vomiting: no nausea/vomiting    Complications: none    Transfer of care protocol was followed      Last vitals:   Visit Vitals  /86 (BP Location: Right arm, Patient Position: Lying)   Pulse 85   Temp 36.5 °C (97.7 °F) (Skin)   Resp 18   SpO2 95%

## 2022-03-21 NOTE — PLAN OF CARE
Procedure and recovery completed without complication. Pt AAOx4, in NAD, vitals recovered to baseline. Pt discussed case with MD. Discharge instructions reviewed, pt verbalizes understanding.

## 2022-03-21 NOTE — H&P
Short Stay Endoscopy History and Physical    PCP - Eric Oconnor MD  Referring Physician - Merline Martínez MD  [address]  [address]  [place] [address]    Procedure - EGD  ASA - per anesthesia  Mallampati - per anesthesia  History of Anesthesia problems - no  Family history Anesthesia problems -  no   Plan of anesthesia - General    HPI:  This is a 61 y.o. male here for evaluation of: hx of gastric ulcer    Reflux - no  Dysphagia - no  Abdominal pain - no  Diarrhea - no    ROS:  Constitutional: No fevers, chills, No weight loss  CV: No chest pain  Pulm: No cough, No shortness of breath  GI: see HPI    Medical History:  has a past medical history of Arthritis, GIB (gastrointestinal bleeding) (12/03/2021), and Hypertension.    Surgical History:  has a past surgical history that includes Hernia repair (1990'S) and Esophagogastroduodenoscopy (N/A, 12/6/2021).    Family History: family history is not on file..    Social History:  reports that he has never smoked. He has never used smokeless tobacco. He reports that he does not drink alcohol and does not use drugs.    Review of patient's allergies indicates:  No Known Allergies    Medications:   Medications Prior to Admission   Medication Sig Dispense Refill Last Dose    gabapentin (NEURONTIN) 600 MG tablet Take 600 mg by mouth 2 (two) times a day.   3/20/2022 at Unknown time    hydroCHLOROthiazide (HYDRODIURIL) 25 MG tablet Take 25 mg by mouth once daily.   3/21/2022 at Unknown time    losartan (COZAAR) 100 MG tablet Take 100 mg by mouth once daily.   3/21/2022 at Unknown time    pantoprazole (PROTONIX) 40 MG tablet Take 1 tablet (40 mg total) by mouth 2 (two) times daily. 30 tablet 0 3/20/2022 at Unknown time    ammonium lactate 12 % Crea Apply 1 application topically 2 (two) times daily.       ciclopirox (PENLAC) 8 % Soln        fluticasone propionate (FLONASE) 50 mcg/actuation nasal spray by Each Nostril route.       ketoconazole (NIZORAL) 2 % cream  Apply topically once daily. 1 Tube 3        Physical Exam:    Vital Signs: There were no vitals filed for this visit.    General Appearance: Well appearing in no acute distress  Eyes:    No scleral icterus  Lungs: no labored breathing  Heart:  Regular  Abdomen: non tender    Labs:  Lab Results   Component Value Date    WBC 7.06 01/25/2022    HGB 11.6 (L) 01/25/2022    HCT 40.7 01/25/2022     01/25/2022    ALT 20 12/03/2021    AST 23 12/03/2021     12/06/2021    K 3.6 12/06/2021     12/06/2021    CREATININE 0.8 12/06/2021    BUN 8 12/06/2021    CO2 25 12/06/2021    TSH 1.860 12/03/2021    INR 1.0 12/03/2021       I have explained the risks and benefits of this endoscopic procedure to the patient including but not limited to bleeding, inflammation, infection, perforation, and death.      Zeus Kwon MD

## 2022-03-27 NOTE — ANESTHESIA POSTPROCEDURE EVALUATION
Anesthesia Post Evaluation    Patient: Dru Baron    Procedure(s) Performed: Procedure(s) (LRB):  ESOPHAGOGASTRODUODENOSCOPY (EGD) (N/A)    Final Anesthesia Type: general      Patient location during evaluation: GI PACU  Patient participation: Yes- Able to Participate  Level of consciousness: awake and alert  Post-procedure vital signs: reviewed and stable  Pain management: adequate  Airway patency: patent    PONV status at discharge: No PONV  Anesthetic complications: no      Cardiovascular status: blood pressure returned to baseline and hemodynamically stable  Respiratory status: unassisted and spontaneous ventilation  Hydration status: euvolemic  Follow-up not needed.          Vitals Value Taken Time   /88 03/21/22 1448   Temp 36.5 °C (97.7 °F) 03/21/22 1418   Pulse 60 03/21/22 1448   Resp 18 03/21/22 1448   SpO2 95 % 03/21/22 1448         Event Time   Out of Recovery 14:48:00         Pain/Noni Score: No data recorded

## 2022-04-14 LAB
FINAL PATHOLOGIC DIAGNOSIS: NORMAL
GROSS: NORMAL
Lab: NORMAL
SUPPLEMENTAL DIAGNOSIS: NORMAL

## 2022-04-18 ENCOUNTER — TELEPHONE (OUTPATIENT)
Dept: ENDOSCOPY | Facility: HOSPITAL | Age: 62
End: 2022-04-18
Payer: MEDICARE

## 2022-04-18 NOTE — TELEPHONE ENCOUNTER
"----- Message from Zeus Kwon MD sent at 4/18/2022  9:35 AM CDT -----  Marisa- I spoke to this pt. He does have BE. Please put him on a recall list for EGD in 3 years.      ----- Message -----  From: Luisa Jose MD  Sent: 4/13/2022   5:36 PM CDT  To: Zeus Kwon MD    Sorry it took me a minute    I had to request material and do additional stains - their stainer was out so anyway    I would call this    Focal intestinal metaplasia  Negative for dysplasia    Reviewed by two additional GI pathologists and myself    I requested he updated the report    Jacki and I are working on getting all the GI path from  differently routed   ----- Message -----  From: Zeus Kwon MD  Sent: 3/29/2022  11:08 AM CDT  To: Luisa Jose MD    Thanks Luisa. Let me know.      ----- Message -----  From: Luisa Jose MD  Sent: 3/28/2022   4:32 PM CDT  To: Zeus Kwon MD    Ok sure    ----- Message -----  From: Zeus Kwon MD  Sent: 3/24/2022   8:29 AM CDT  To: MD Luisa Vega- Can you review the path from the EGD on this patient? I took biopsies of the distal esophagus to r/u BE. Procedure was done at . Dr. Luevano's report mentions that there was no metaplasia or dysplasia identified, but then also states that "If biopsies were obtained above a point located 1 cm proximal to gastric folds, then these changes would represent Torres's mucosa.  If obtained from a point below that, then the possibility of normal cardiac mucosa cannot be excluded."    Perhaps I'm mistaken, but I thought BE diagnosis is based on finding intestinal metaplasia. If there is not intestinal metaplasia. If there was no intestinal metaplasia, this should not be BE, correct?    ThanksZeus"

## 2022-07-08 NOTE — ANESTHESIA PREPROCEDURE EVALUATION
03/21/2022  Dru Baron is a 61 y.o., male.      Pre-op Assessment    I have reviewed the Patient Summary Reports.     I have reviewed the Nursing Notes.       Review of Systems  Anesthesia Hx:  No problems with previous Anesthesia  Denies Family Hx of Anesthesia complications.   Denies Personal Hx of Anesthesia complications.   Social:  Non-Smoker, No Alcohol Use    Cardiovascular:   Hypertension Denies MI.   Denies Dysrhythmias.     Pulmonary:  Pulmonary Normal    Renal/:  Renal/ Normal     Hepatic/GI:   GERD Gastric, duodenal ulcer; no current GERD symptoms or N/V   Neurological:  Neurology Normal    Endocrine:  Endocrine Normal        Physical Exam  General: Well nourished, Cooperative and Alert    Airway:  Mallampati: III   Mouth Opening: Normal  TM Distance: 4 - 6 cm  Tongue: Normal  Neck ROM: Normal ROM    Dental:  Intact    Chest/Lungs:  Clear to auscultation, Normal Respiratory Rate    Heart:  Rate: Normal  Rhythm: Regular Rhythm      Wt Readings from Last 3 Encounters:   12/03/21 (!) 158.8 kg (350 lb 1.5 oz)   07/27/20 (!) 158.8 kg (350 lb)     Temp Readings from Last 3 Encounters:   12/06/21 36.8 °C (98.3 °F) (Oral)     BP Readings from Last 3 Encounters:   01/25/22 (!) 158/98   12/06/21 (!) 157/82   07/27/20 (!) 156/95     Pulse Readings from Last 3 Encounters:   01/25/22 94   12/06/21 69   07/27/20 73         Anesthesia Plan  Type of Anesthesia, risks & benefits discussed:    Anesthesia Type: Gen Natural Airway, MAC  Intra-op Monitoring Plan: Standard ASA Monitors  Post Op Pain Control Plan: multimodal analgesia and IV/PO Opioids PRN  Induction:  IV  Informed Consent: Informed consent signed with the Patient and all parties understand the risks and agree with anesthesia plan.  All questions answered.   ASA Score: 3  Day of Surgery Review of History & Physical: H&P Update referred to the  surgeon/provider.    Ready For Surgery From Anesthesia Perspective.     .       No

## 2022-10-17 ENCOUNTER — OFFICE VISIT (OUTPATIENT)
Dept: PODIATRY | Facility: CLINIC | Age: 62
End: 2022-10-17
Payer: MEDICARE

## 2022-10-17 VITALS — HEIGHT: 74 IN | BODY MASS INDEX: 40.43 KG/M2 | WEIGHT: 315 LBS

## 2022-10-17 DIAGNOSIS — L84 CORN OR CALLUS: ICD-10-CM

## 2022-10-17 DIAGNOSIS — B35.1 ONYCHOMYCOSIS DUE TO DERMATOPHYTE: Primary | ICD-10-CM

## 2022-10-17 PROCEDURE — 99999 PR PBB SHADOW E&M-EST. PATIENT-LVL III: ICD-10-PCS | Mod: PBBFAC,,, | Performed by: PODIATRIST

## 2022-10-17 PROCEDURE — 1159F MED LIST DOCD IN RCRD: CPT | Mod: CPTII,S$GLB,, | Performed by: PODIATRIST

## 2022-10-17 PROCEDURE — 1159F PR MEDICATION LIST DOCUMENTED IN MEDICAL RECORD: ICD-10-PCS | Mod: CPTII,S$GLB,, | Performed by: PODIATRIST

## 2022-10-17 PROCEDURE — 4010F PR ACE/ARB THEARPY RXD/TAKEN: ICD-10-PCS | Mod: CPTII,S$GLB,, | Performed by: PODIATRIST

## 2022-10-17 PROCEDURE — 99499 UNLISTED E&M SERVICE: CPT | Mod: S$GLB,,, | Performed by: PODIATRIST

## 2022-10-17 PROCEDURE — 99499 NO LOS: ICD-10-PCS | Mod: S$GLB,,, | Performed by: PODIATRIST

## 2022-10-17 PROCEDURE — 4010F ACE/ARB THERAPY RXD/TAKEN: CPT | Mod: CPTII,S$GLB,, | Performed by: PODIATRIST

## 2022-10-17 PROCEDURE — 99999 PR PBB SHADOW E&M-EST. PATIENT-LVL III: CPT | Mod: PBBFAC,,, | Performed by: PODIATRIST

## 2023-05-07 ENCOUNTER — HOSPITAL ENCOUNTER (EMERGENCY)
Facility: HOSPITAL | Age: 63
Discharge: HOME | End: 2023-05-07
Attending: EMERGENCY MEDICINE
Payer: MEDICARE

## 2023-05-07 VITALS
TEMPERATURE: 98 F | SYSTOLIC BLOOD PRESSURE: 124 MMHG | WEIGHT: 315 LBS | BODY MASS INDEX: 40.43 KG/M2 | HEIGHT: 74 IN | HEART RATE: 51 BPM | RESPIRATION RATE: 17 BRPM | DIASTOLIC BLOOD PRESSURE: 79 MMHG | OXYGEN SATURATION: 96 %

## 2023-05-07 DIAGNOSIS — R51.9 HEADACHE: ICD-10-CM

## 2023-05-07 DIAGNOSIS — N17.9 AKI (ACUTE KIDNEY INJURY): Primary | ICD-10-CM

## 2023-05-07 LAB
ALBUMIN SERPL BCP-MCNC: 3.9 G/DL (ref 3.5–5.2)
ALP SERPL-CCNC: 101 U/L (ref 55–135)
ALT SERPL W/O P-5'-P-CCNC: 16 U/L (ref 10–44)
ANION GAP SERPL CALC-SCNC: 10 MMOL/L (ref 8–16)
ANION GAP SERPL CALC-SCNC: 11 MMOL/L (ref 8–16)
AST SERPL-CCNC: 19 U/L (ref 10–40)
BACTERIA #/AREA URNS HPF: ABNORMAL /HPF
BASOPHILS # BLD AUTO: 0.05 K/UL (ref 0–0.2)
BASOPHILS NFR BLD: 0.5 % (ref 0–1.9)
BILIRUB SERPL-MCNC: 1.1 MG/DL (ref 0.1–1)
BILIRUB UR QL STRIP: NEGATIVE
BNP SERPL-MCNC: 62 PG/ML (ref 0–99)
BUN SERPL-MCNC: 19 MG/DL (ref 8–23)
BUN SERPL-MCNC: 21 MG/DL (ref 8–23)
CALCIUM SERPL-MCNC: 10.1 MG/DL (ref 8.7–10.5)
CALCIUM SERPL-MCNC: 9.6 MG/DL (ref 8.7–10.5)
CHLORIDE SERPL-SCNC: 100 MMOL/L (ref 95–110)
CHLORIDE SERPL-SCNC: 102 MMOL/L (ref 95–110)
CLARITY UR: ABNORMAL
CO2 SERPL-SCNC: 23 MMOL/L (ref 23–29)
CO2 SERPL-SCNC: 26 MMOL/L (ref 23–29)
COLOR UR: YELLOW
CREAT SERPL-MCNC: 2.6 MG/DL (ref 0.5–1.4)
CREAT SERPL-MCNC: 2.6 MG/DL (ref 0.5–1.4)
CREAT UR-MCNC: 190.9 MG/DL (ref 23–375)
CTP QC/QA: YES
DIFFERENTIAL METHOD: ABNORMAL
EOSINOPHIL # BLD AUTO: 0.1 K/UL (ref 0–0.5)
EOSINOPHIL NFR BLD: 1 % (ref 0–8)
ERYTHROCYTE [DISTWIDTH] IN BLOOD BY AUTOMATED COUNT: 13.6 % (ref 11.5–14.5)
EST. GFR  (NO RACE VARIABLE): 27 ML/MIN/1.73 M^2
EST. GFR  (NO RACE VARIABLE): 27 ML/MIN/1.73 M^2
GLUCOSE SERPL-MCNC: 128 MG/DL (ref 70–110)
GLUCOSE SERPL-MCNC: 96 MG/DL (ref 70–110)
GLUCOSE UR QL STRIP: NEGATIVE
HCT VFR BLD AUTO: 49.9 % (ref 40–54)
HGB BLD-MCNC: 16 G/DL (ref 14–18)
HGB UR QL STRIP: NEGATIVE
HYALINE CASTS #/AREA URNS LPF: 52 /LPF
IMM GRANULOCYTES # BLD AUTO: 0.04 K/UL (ref 0–0.04)
IMM GRANULOCYTES NFR BLD AUTO: 0.4 % (ref 0–0.5)
KETONES UR QL STRIP: NEGATIVE
LEUKOCYTE ESTERASE UR QL STRIP: NEGATIVE
LYMPHOCYTES # BLD AUTO: 2.1 K/UL (ref 1–4.8)
LYMPHOCYTES NFR BLD: 19.2 % (ref 18–48)
MCH RBC QN AUTO: 27.1 PG (ref 27–31)
MCHC RBC AUTO-ENTMCNC: 32.1 G/DL (ref 32–36)
MCV RBC AUTO: 85 FL (ref 82–98)
MICROSCOPIC COMMENT: ABNORMAL
MONOCYTES # BLD AUTO: 0.9 K/UL (ref 0.3–1)
MONOCYTES NFR BLD: 7.9 % (ref 4–15)
NEUTROPHILS # BLD AUTO: 7.8 K/UL (ref 1.8–7.7)
NEUTROPHILS NFR BLD: 71 % (ref 38–73)
NITRITE UR QL STRIP: NEGATIVE
NRBC BLD-RTO: 0 /100 WBC
PH UR STRIP: 7 [PH] (ref 5–8)
PLATELET # BLD AUTO: 183 K/UL (ref 150–450)
PLATELET BLD QL SMEAR: ABNORMAL
PMV BLD AUTO: ABNORMAL FL (ref 9.2–12.9)
POCT GLUCOSE: 159 MG/DL (ref 70–110)
POTASSIUM SERPL-SCNC: 3.7 MMOL/L (ref 3.5–5.1)
POTASSIUM SERPL-SCNC: 3.7 MMOL/L (ref 3.5–5.1)
PROT SERPL-MCNC: 7.6 G/DL (ref 6–8.4)
PROT UR QL STRIP: ABNORMAL
RBC # BLD AUTO: 5.9 M/UL (ref 4.6–6.2)
RBC #/AREA URNS HPF: 1 /HPF (ref 0–4)
SARS-COV-2 RDRP RESP QL NAA+PROBE: NEGATIVE
SODIUM SERPL-SCNC: 135 MMOL/L (ref 136–145)
SODIUM SERPL-SCNC: 137 MMOL/L (ref 136–145)
SODIUM UR-SCNC: 45 MMOL/L (ref 20–250)
SP GR UR STRIP: 1.01 (ref 1–1.03)
SQUAMOUS #/AREA URNS HPF: 1 /HPF
TROPONIN I SERPL DL<=0.01 NG/ML-MCNC: 0.03 NG/ML (ref 0–0.03)
TROPONIN I SERPL DL<=0.01 NG/ML-MCNC: 0.05 NG/ML (ref 0–0.03)
URN SPEC COLLECT METH UR: ABNORMAL
UROBILINOGEN UR STRIP-ACNC: NEGATIVE EU/DL
WBC # BLD AUTO: 10.93 K/UL (ref 3.9–12.7)
WBC #/AREA URNS HPF: 6 /HPF (ref 0–5)

## 2023-05-07 PROCEDURE — 96360 HYDRATION IV INFUSION INIT: CPT

## 2023-05-07 PROCEDURE — 84300 ASSAY OF URINE SODIUM: CPT | Performed by: EMERGENCY MEDICINE

## 2023-05-07 PROCEDURE — 96361 HYDRATE IV INFUSION ADD-ON: CPT

## 2023-05-07 PROCEDURE — 93005 ELECTROCARDIOGRAM TRACING: CPT

## 2023-05-07 PROCEDURE — 83880 ASSAY OF NATRIURETIC PEPTIDE: CPT | Performed by: EMERGENCY MEDICINE

## 2023-05-07 PROCEDURE — 81000 URINALYSIS NONAUTO W/SCOPE: CPT | Performed by: EMERGENCY MEDICINE

## 2023-05-07 PROCEDURE — 84484 ASSAY OF TROPONIN QUANT: CPT | Mod: 91 | Performed by: EMERGENCY MEDICINE

## 2023-05-07 PROCEDURE — 93010 EKG 12-LEAD: ICD-10-PCS | Mod: ,,, | Performed by: INTERNAL MEDICINE

## 2023-05-07 PROCEDURE — 80053 COMPREHEN METABOLIC PANEL: CPT | Performed by: EMERGENCY MEDICINE

## 2023-05-07 PROCEDURE — 85025 COMPLETE CBC W/AUTO DIFF WBC: CPT | Performed by: EMERGENCY MEDICINE

## 2023-05-07 PROCEDURE — 80048 BASIC METABOLIC PNL TOTAL CA: CPT | Mod: XB | Performed by: EMERGENCY MEDICINE

## 2023-05-07 PROCEDURE — 82570 ASSAY OF URINE CREATININE: CPT | Performed by: EMERGENCY MEDICINE

## 2023-05-07 PROCEDURE — 93010 ELECTROCARDIOGRAM REPORT: CPT | Mod: ,,, | Performed by: INTERNAL MEDICINE

## 2023-05-07 PROCEDURE — 25000003 PHARM REV CODE 250: Performed by: EMERGENCY MEDICINE

## 2023-05-07 PROCEDURE — 99285 EMERGENCY DEPT VISIT HI MDM: CPT | Mod: 25

## 2023-05-07 RX ORDER — AMLODIPINE BESYLATE 5 MG/1
5 TABLET ORAL DAILY
Qty: 30 TABLET | Refills: 0 | Status: SHIPPED | OUTPATIENT
Start: 2023-05-07 | End: 2023-06-06

## 2023-05-07 RX ORDER — ACETAMINOPHEN 500 MG
1000 TABLET ORAL
Status: COMPLETED | OUTPATIENT
Start: 2023-05-07 | End: 2023-05-07

## 2023-05-07 RX ADMIN — ACETAMINOPHEN 1000 MG: 500 TABLET ORAL at 04:05

## 2023-05-07 RX ADMIN — SODIUM CHLORIDE 1000 ML: 9 INJECTION, SOLUTION INTRAVENOUS at 06:05

## 2023-05-07 RX ADMIN — SODIUM CHLORIDE 1000 ML: 9 INJECTION, SOLUTION INTRAVENOUS at 04:05

## 2023-05-07 NOTE — ED TRIAGE NOTES
"Patient reporting 9/10 headache, bilateral pressure to his eyes, and n/v at night. Patient hypotensive upon assessment. Pt also reporting fatigue and generalized weakness. Patient states that he "checked his BP last night and it was elevated so he took an extra BP med of each 1 he normally takes today." Pt. Prescribed Losartan and HCTZ. Pt is aaox4, NAD noted.   "

## 2023-05-08 NOTE — DISCHARGE INSTRUCTIONS
1) Please stop the hydrochlorothiazide, as well as the losartan.    2) Should your BP remain above 180/100, please start taking the amlodipine 5 mg daily.    3) please continue to record your blood pressure twice a day.    4) please call your PCP tomorrow morning for an appointment this week.    5) repeat blood work Tuesday morning.

## 2025-07-01 ENCOUNTER — HOSPITAL ENCOUNTER (INPATIENT)
Facility: HOSPITAL | Age: 65
LOS: 3 days | Discharge: HOME | DRG: 286 | End: 2025-07-04
Attending: EMERGENCY MEDICINE | Admitting: STUDENT IN AN ORGANIZED HEALTH CARE EDUCATION/TRAINING PROGRAM
Payer: MEDICARE

## 2025-07-01 DIAGNOSIS — R06.02 SOB (SHORTNESS OF BREATH): ICD-10-CM

## 2025-07-01 DIAGNOSIS — I48.20 CHRONIC ATRIAL FIBRILLATION: ICD-10-CM

## 2025-07-01 DIAGNOSIS — R06.02 SHORTNESS OF BREATH: ICD-10-CM

## 2025-07-01 DIAGNOSIS — I50.9 CONGESTIVE HEART FAILURE, UNSPECIFIED HF CHRONICITY, UNSPECIFIED HEART FAILURE TYPE: Primary | ICD-10-CM

## 2025-07-01 DIAGNOSIS — R03.0 ELEVATED BLOOD PRESSURE READING: ICD-10-CM

## 2025-07-01 PROBLEM — R79.89 ELEVATED TROPONIN: Status: ACTIVE | Noted: 2025-07-01

## 2025-07-01 PROBLEM — J81.1 PULMONARY EDEMA: Status: ACTIVE | Noted: 2025-07-01

## 2025-07-01 LAB
ABSOLUTE EOSINOPHIL (OHS): 0.12 K/UL
ABSOLUTE MONOCYTE (OHS): 0.73 K/UL (ref 0.3–1)
ABSOLUTE NEUTROPHIL COUNT (OHS): 6.5 K/UL (ref 1.8–7.7)
ALBUMIN SERPL BCP-MCNC: 3.8 G/DL (ref 3.5–5.2)
ALP SERPL-CCNC: 89 UNIT/L (ref 40–150)
ALT SERPL W/O P-5'-P-CCNC: 8 UNIT/L (ref 10–44)
ANION GAP (OHS): 12 MMOL/L (ref 8–16)
AST SERPL-CCNC: 20 UNIT/L (ref 11–45)
BASOPHILS # BLD AUTO: 0.05 K/UL
BASOPHILS NFR BLD AUTO: 0.6 %
BILIRUB SERPL-MCNC: 1.5 MG/DL (ref 0.1–1)
BNP SERPL-MCNC: 940 PG/ML (ref 0–99)
BUN SERPL-MCNC: 11 MG/DL (ref 8–23)
CALCIUM SERPL-MCNC: 9.2 MG/DL (ref 8.7–10.5)
CHLORIDE SERPL-SCNC: 106 MMOL/L (ref 95–110)
CO2 SERPL-SCNC: 21 MMOL/L (ref 23–29)
CREAT SERPL-MCNC: 1.1 MG/DL (ref 0.5–1.4)
CTP QC/QA: YES
CTP QC/QA: YES
ERYTHROCYTE [DISTWIDTH] IN BLOOD BY AUTOMATED COUNT: 13.1 % (ref 11.5–14.5)
GFR SERPLBLD CREATININE-BSD FMLA CKD-EPI: >60 ML/MIN/1.73/M2
GLUCOSE SERPL-MCNC: 97 MG/DL (ref 70–110)
HCT VFR BLD AUTO: 46.1 % (ref 40–54)
HGB BLD-MCNC: 14.3 GM/DL (ref 14–18)
IMM GRANULOCYTES # BLD AUTO: 0.02 K/UL (ref 0–0.04)
IMM GRANULOCYTES NFR BLD AUTO: 0.2 % (ref 0–0.5)
LYMPHOCYTES # BLD AUTO: 1.13 K/UL (ref 1–4.8)
MAGNESIUM SERPL-MCNC: 1.8 MG/DL (ref 1.6–2.6)
MCH RBC QN AUTO: 28.4 PG (ref 27–31)
MCHC RBC AUTO-ENTMCNC: 31 G/DL (ref 32–36)
MCV RBC AUTO: 92 FL (ref 82–98)
NUCLEATED RBC (/100WBC) (OHS): 0 /100 WBC
PLATELET # BLD AUTO: 150 K/UL (ref 150–450)
PMV BLD AUTO: ABNORMAL FL
POC MOLECULAR INFLUENZA A AGN: NEGATIVE
POC MOLECULAR INFLUENZA B AGN: NEGATIVE
POTASSIUM SERPL-SCNC: 4.1 MMOL/L (ref 3.5–5.1)
PROT SERPL-MCNC: 7.5 GM/DL (ref 6–8.4)
RBC # BLD AUTO: 5.04 M/UL (ref 4.6–6.2)
RELATIVE EOSINOPHIL (OHS): 1.4 %
RELATIVE LYMPHOCYTE (OHS): 13.2 % (ref 18–48)
RELATIVE MONOCYTE (OHS): 8.5 % (ref 4–15)
RELATIVE NEUTROPHIL (OHS): 76.1 % (ref 38–73)
SARS-COV-2 RDRP RESP QL NAA+PROBE: NEGATIVE
SODIUM SERPL-SCNC: 139 MMOL/L (ref 136–145)
TROPONIN I SERPL DL<=0.01 NG/ML-MCNC: 0.06 NG/ML
TROPONIN I SERPL DL<=0.01 NG/ML-MCNC: 0.06 NG/ML
TROPONIN I SERPL DL<=0.01 NG/ML-MCNC: 0.07 NG/ML
WBC # BLD AUTO: 8.55 K/UL (ref 3.9–12.7)

## 2025-07-01 PROCEDURE — 94761 N-INVAS EAR/PLS OXIMETRY MLT: CPT

## 2025-07-01 PROCEDURE — 25000003 PHARM REV CODE 250: Performed by: PHYSICIAN ASSISTANT

## 2025-07-01 PROCEDURE — 25000003 PHARM REV CODE 250: Performed by: EMERGENCY MEDICINE

## 2025-07-01 PROCEDURE — 63600175 PHARM REV CODE 636 W HCPCS: Performed by: EMERGENCY MEDICINE

## 2025-07-01 PROCEDURE — 99291 CRITICAL CARE FIRST HOUR: CPT

## 2025-07-01 PROCEDURE — 84484 ASSAY OF TROPONIN QUANT: CPT | Performed by: PHYSICIAN ASSISTANT

## 2025-07-01 PROCEDURE — 83735 ASSAY OF MAGNESIUM: CPT | Performed by: PHYSICIAN ASSISTANT

## 2025-07-01 PROCEDURE — 84484 ASSAY OF TROPONIN QUANT: CPT | Performed by: EMERGENCY MEDICINE

## 2025-07-01 PROCEDURE — 93005 ELECTROCARDIOGRAM TRACING: CPT

## 2025-07-01 PROCEDURE — 96374 THER/PROPH/DIAG INJ IV PUSH: CPT

## 2025-07-01 PROCEDURE — 63600175 PHARM REV CODE 636 W HCPCS: Performed by: PHYSICIAN ASSISTANT

## 2025-07-01 PROCEDURE — 36415 COLL VENOUS BLD VENIPUNCTURE: CPT | Performed by: PHYSICIAN ASSISTANT

## 2025-07-01 PROCEDURE — 11000001 HC ACUTE MED/SURG PRIVATE ROOM

## 2025-07-01 PROCEDURE — 96375 TX/PRO/DX INJ NEW DRUG ADDON: CPT

## 2025-07-01 PROCEDURE — 82040 ASSAY OF SERUM ALBUMIN: CPT | Performed by: EMERGENCY MEDICINE

## 2025-07-01 PROCEDURE — 93010 ELECTROCARDIOGRAM REPORT: CPT | Mod: ,,, | Performed by: INTERNAL MEDICINE

## 2025-07-01 PROCEDURE — 83036 HEMOGLOBIN GLYCOSYLATED A1C: CPT | Performed by: PHYSICIAN ASSISTANT

## 2025-07-01 PROCEDURE — 87635 SARS-COV-2 COVID-19 AMP PRB: CPT | Performed by: PHYSICIAN ASSISTANT

## 2025-07-01 PROCEDURE — 83880 ASSAY OF NATRIURETIC PEPTIDE: CPT | Performed by: EMERGENCY MEDICINE

## 2025-07-01 PROCEDURE — 94644 CONT INHLJ TX 1ST HOUR: CPT

## 2025-07-01 PROCEDURE — 85025 COMPLETE CBC W/AUTO DIFF WBC: CPT | Performed by: EMERGENCY MEDICINE

## 2025-07-01 PROCEDURE — 25000242 PHARM REV CODE 250 ALT 637 W/ HCPCS: Performed by: EMERGENCY MEDICINE

## 2025-07-01 RX ORDER — NAPROXEN SODIUM 220 MG/1
81 TABLET, FILM COATED ORAL DAILY
Status: DISCONTINUED | OUTPATIENT
Start: 2025-07-02 | End: 2025-07-04 | Stop reason: HOSPADM

## 2025-07-01 RX ORDER — NITROGLYCERIN 20 MG/G
1 OINTMENT TOPICAL
Status: COMPLETED | OUTPATIENT
Start: 2025-07-01 | End: 2025-07-01

## 2025-07-01 RX ORDER — ASPIRIN 325 MG
325 TABLET ORAL
Status: COMPLETED | OUTPATIENT
Start: 2025-07-01 | End: 2025-07-01

## 2025-07-01 RX ORDER — TALC
6 POWDER (GRAM) TOPICAL NIGHTLY PRN
Status: DISCONTINUED | OUTPATIENT
Start: 2025-07-01 | End: 2025-07-04 | Stop reason: HOSPADM

## 2025-07-01 RX ORDER — AMLODIPINE BESYLATE 5 MG/1
5 TABLET ORAL
Status: COMPLETED | OUTPATIENT
Start: 2025-07-01 | End: 2025-07-01

## 2025-07-01 RX ORDER — AMOXICILLIN 250 MG
1 CAPSULE ORAL DAILY PRN
Status: DISCONTINUED | OUTPATIENT
Start: 2025-07-01 | End: 2025-07-04 | Stop reason: HOSPADM

## 2025-07-01 RX ORDER — SODIUM CHLORIDE 0.9 % (FLUSH) 0.9 %
10 SYRINGE (ML) INJECTION
Status: DISCONTINUED | OUTPATIENT
Start: 2025-07-01 | End: 2025-07-04 | Stop reason: HOSPADM

## 2025-07-01 RX ORDER — ACETAMINOPHEN 325 MG/1
650 TABLET ORAL EVERY 6 HOURS PRN
Status: DISCONTINUED | OUTPATIENT
Start: 2025-07-01 | End: 2025-07-04 | Stop reason: HOSPADM

## 2025-07-01 RX ORDER — ONDANSETRON 4 MG/1
4 TABLET, ORALLY DISINTEGRATING ORAL EVERY 6 HOURS PRN
Status: DISCONTINUED | OUTPATIENT
Start: 2025-07-01 | End: 2025-07-04 | Stop reason: HOSPADM

## 2025-07-01 RX ORDER — HYDRALAZINE HYDROCHLORIDE 20 MG/ML
10 INJECTION INTRAMUSCULAR; INTRAVENOUS
Status: COMPLETED | OUTPATIENT
Start: 2025-07-01 | End: 2025-07-01

## 2025-07-01 RX ORDER — BENZONATATE 100 MG/1
100 CAPSULE ORAL 3 TIMES DAILY PRN
Status: DISCONTINUED | OUTPATIENT
Start: 2025-07-01 | End: 2025-07-04 | Stop reason: HOSPADM

## 2025-07-01 RX ORDER — FUROSEMIDE 10 MG/ML
40 INJECTION INTRAMUSCULAR; INTRAVENOUS
Status: COMPLETED | OUTPATIENT
Start: 2025-07-01 | End: 2025-07-01

## 2025-07-01 RX ORDER — CALCIUM CARBONATE 200(500)MG
500 TABLET,CHEWABLE ORAL DAILY PRN
Status: DISCONTINUED | OUTPATIENT
Start: 2025-07-01 | End: 2025-07-04 | Stop reason: HOSPADM

## 2025-07-01 RX ORDER — AMLODIPINE BESYLATE 5 MG/1
5 TABLET ORAL DAILY
Status: DISCONTINUED | OUTPATIENT
Start: 2025-07-02 | End: 2025-07-04 | Stop reason: HOSPADM

## 2025-07-01 RX ORDER — GABAPENTIN 300 MG/1
600 CAPSULE ORAL 2 TIMES DAILY
Status: DISCONTINUED | OUTPATIENT
Start: 2025-07-01 | End: 2025-07-04 | Stop reason: HOSPADM

## 2025-07-01 RX ORDER — IPRATROPIUM BROMIDE AND ALBUTEROL SULFATE 2.5; .5 MG/3ML; MG/3ML
3 SOLUTION RESPIRATORY (INHALATION)
Status: COMPLETED | OUTPATIENT
Start: 2025-07-01 | End: 2025-07-01

## 2025-07-01 RX ORDER — FUROSEMIDE 10 MG/ML
40 INJECTION INTRAMUSCULAR; INTRAVENOUS 2 TIMES DAILY
Status: DISCONTINUED | OUTPATIENT
Start: 2025-07-01 | End: 2025-07-03

## 2025-07-01 RX ORDER — HYDRALAZINE HYDROCHLORIDE 25 MG/1
25 TABLET, FILM COATED ORAL EVERY 8 HOURS PRN
Status: DISCONTINUED | OUTPATIENT
Start: 2025-07-01 | End: 2025-07-04 | Stop reason: HOSPADM

## 2025-07-01 RX ADMIN — GABAPENTIN 600 MG: 300 CAPSULE ORAL at 08:07

## 2025-07-01 RX ADMIN — Medication 6 MG: at 08:07

## 2025-07-01 RX ADMIN — IPRATROPIUM BROMIDE AND ALBUTEROL SULFATE 3 ML: 2.5; .5 SOLUTION RESPIRATORY (INHALATION) at 11:07

## 2025-07-01 RX ADMIN — IPRATROPIUM BROMIDE AND ALBUTEROL SULFATE 3 ML: 2.5; .5 SOLUTION RESPIRATORY (INHALATION) at 10:07

## 2025-07-01 RX ADMIN — ASPIRIN 325 MG: 325 TABLET ORAL at 12:07

## 2025-07-01 RX ADMIN — NITROGLYCERIN 1 INCH: 20 OINTMENT TOPICAL at 11:07

## 2025-07-01 RX ADMIN — AMLODIPINE BESYLATE 5 MG: 5 TABLET ORAL at 11:07

## 2025-07-01 RX ADMIN — FUROSEMIDE 40 MG: 10 INJECTION, SOLUTION INTRAVENOUS at 11:07

## 2025-07-01 RX ADMIN — FUROSEMIDE 40 MG: 10 INJECTION, SOLUTION INTRAVENOUS at 05:07

## 2025-07-01 RX ADMIN — HYDRALAZINE HYDROCHLORIDE 10 MG: 20 INJECTION INTRAMUSCULAR; INTRAVENOUS at 12:07

## 2025-07-01 NOTE — H&P
[facility] Medicine  History & Physical    Patient Name: Dru Baron  MRN: [id number]  Patient Class: IP- Inpatient  Admission Date: 7/1/2025  Attending Physician: Scott Ball MD   Primary Care Provider: Eric Oconnor MD         Patient information was obtained from patient, past medical records, and ER records.     Subjective:     Principal Problem:Pulmonary edema    Chief Complaint:   Chief Complaint   Patient presents with    Shortness of Breath     Pt presents to the ED with c/o shortness of breath with exertion and productive cough x 2 days. Pt also c/o anterior chest pain.           HPI: Dru Baron 64 y.o. male with HTN and arthritis presents to the hospital with a chief complaint of shortness breath.  He reports 5 days of shortness a breath with associated lower extremity edema nonproductive cough.  He finds his shortness breath worsens with lying flat as improved with IV Lasix in the emergency room.  He has never experienced symptoms such as this in the past.  He denies any history of heart failure.  He has been compliant with his home amlodipine.  He denies fever nausea vomiting abdominal pain melena hematuria hematemesis dizziness and syncope.    In the ED, afebrile without leukocytosis EKG without ST elevation chest x-ray with pulmonary edema troponin 0.056  COVID and flu negative.    Past Medical History:   Diagnosis Date    Arthritis     GIB (gastrointestinal bleeding) 12/03/2021    Hypertension        Past Surgical History:   Procedure Laterality Date    ESOPHAGOGASTRODUODENOSCOPY N/A 12/6/2021    Procedure: EGD (ESOPHAGOGASTRODUODENOSCOPY);  Surgeon: Pritesh Snow MD;  Location: [facility];  Service: Endoscopy;  Laterality: N/A;    ESOPHAGOGASTRODUODENOSCOPY N/A 3/21/2022    Procedure: ESOPHAGOGASTRODUODENOSCOPY (EGD);  Surgeon: Zeus Kwon MD;  Location: [facility];  Service: Endoscopy;  Laterality: N/A;  Fully vaccinated (pt will bring card in day of  procedure), instr mailed -ml    HERNIA REPAIR      HIATAL       Review of patient's allergies indicates:  No Known Allergies    No current facility-administered medications on file prior to encounter.     Current Outpatient Medications on File Prior to Encounter   Medication Sig    amLODIPine (NORVASC) 5 MG tablet Take 1 tablet (5 mg total) by mouth once daily.    gabapentin (NEURONTIN) 600 MG tablet Take 600 mg by mouth 2 (two) times a day.    ammonium lactate 12 % Crea Apply twice daily to affected parts both feet as needed.    ammonium lactate 12 % Crea Apply 1 application topically 2 (two) times daily.    fluticasone propionate (FLONASE) 50 mcg/actuation nasal spray USE 2 SPRAYS IN EACH NOSTRIL ONCE DAILY    fluticasone propionate (FLONASE) 50 mcg/actuation nasal spray by Each Nostril route.    pantoprazole (PROTONIX) 40 MG tablet Take 1 tablet (40 mg total) by mouth 2 (two) times daily.    [DISCONTINUED] ciclopirox (PENLAC) 8 % Soln Apply topically nightly.    [DISCONTINUED] ciclopirox (PENLAC) 8 % Soln     [DISCONTINUED] gabapentin (NEURONTIN) 600 MG tablet     [DISCONTINUED] hydroCHLOROthiazide (HYDRODIURIL) 25 MG tablet Take 25 mg by mouth once daily.    [DISCONTINUED] ibuprofen (ADVIL,MOTRIN) 800 MG tablet Take 800 mg by mouth 3 (three) times daily as needed.    [DISCONTINUED] ketoconazole (NIZORAL) 2 % cream Apply topically once daily.    [DISCONTINUED] levocetirizine (XYZAL) 5 MG tablet SMARTSI Tablet(s) By Mouth Every Evening    [DISCONTINUED] losartan (COZAAR) 100 MG tablet Take 100 mg by mouth once daily.     Family History    None       Tobacco Use    Smoking status: Never    Smokeless tobacco: Never   Substance and Sexual Activity    Alcohol use: Never    Drug use: Never    Sexual activity: Not on file     Review of Systems   Constitutional:  Negative for chills and fever.   HENT:  Negative for nosebleeds and tinnitus.    Eyes:  Negative for photophobia and visual disturbance.   Respiratory:   Positive for shortness of breath. Negative for wheezing.    Cardiovascular:  Positive for leg swelling. Negative for chest pain and palpitations.   Gastrointestinal:  Negative for abdominal distention, nausea and vomiting.   Genitourinary:  Negative for dysuria, flank pain and hematuria.   Musculoskeletal:  Negative for gait problem and joint swelling.   Skin:  Negative for rash and wound.   Neurological:  Negative for seizures and syncope.     Objective:     Vital Signs (Most Recent):  Temp: 98.5 °F (36.9 °C) (07/01/25 1027)  Pulse: 102 (07/01/25 1248)  Resp: 18 (07/01/25 1109)  BP: (!) 173/93 (07/01/25 1248)  SpO2: 95 % (07/01/25 1249) Vital Signs (24h Range):  Temp:  [98.5 °F (36.9 °C)] 98.5 °F (36.9 °C)  Pulse:  [] 102  Resp:  [16-24] 18  SpO2:  [94 %-96 %] 95 %  BP: (163-184)/() 173/93     Weight: 136.1 kg (300 lb)  Body mass index is 38.52 kg/m².     Physical Exam  Vitals and nursing note reviewed.   Constitutional:       General: He is not in acute distress.     Appearance: He is well-developed.   HENT:      Head: Normocephalic and atraumatic.      Right Ear: External ear normal.      Left Ear: External ear normal.   Eyes:      General:         Right eye: No discharge.         Left eye: No discharge.      Conjunctiva/sclera: Conjunctivae normal.   Neck:      Thyroid: No thyromegaly.   Cardiovascular:      Rate and Rhythm: Normal rate and regular rhythm.      Heart sounds: No murmur heard.  Pulmonary:      Effort: Pulmonary effort is normal. No respiratory distress.      Breath sounds: Rales present.      Comments: On RA  Abdominal:      General: Bowel sounds are normal. There is no distension.      Palpations: Abdomen is soft. There is no mass.      Tenderness: There is no abdominal tenderness.   Musculoskeletal:         General: No deformity.      Cervical back: Normal range of motion.      Right lower leg: Edema present.      Left lower leg: Edema present.   Skin:     General: Skin is warm and  "dry.   Neurological:      Mental Status: He is alert and oriented to person, place, and time.   Psychiatric:         Behavior: Behavior normal.                Significant Labs: CBC:   Recent Labs   Lab 07/01/25  1102   WBC 8.55   HGB 14.3   HCT 46.1        CMP:   Recent Labs   Lab 07/01/25  1102      K 4.1      CO2 21*   GLU 97   BUN 11   CREATININE 1.1   CALCIUM 9.2   PROT 7.5   ALBUMIN 3.8   BILITOT 1.5*   ALKPHOS 89   AST 20   ALT 8*   ANIONGAP 12     Cardiac Markers:   Recent Labs   Lab 07/01/25  1102   *     Troponin:   Recent Labs   Lab 07/01/25  1102   TROPONINI 0.056*       Significant Imaging:   Imaging Results              X-Ray Chest AP Portable (Final result)  Result time 07/01/25 10:59:34      Final result by Tristan Lawrence MD (07/01/25 10:59:34)                   Impression:      Cardiomegaly with bilateral interstitial opacities and small pleural effusions.  Suggest correlation for CHF exacerbation and/or volume overload.      Electronically signed by: Tristan Lawrence MD  Date:    07/01/2025  Time:    10:59               Narrative:    EXAMINATION:  XR CHEST AP PORTABLE    CLINICAL HISTORY:  Provided history is "  Shortness of breath".    TECHNIQUE:  One view of the chest.    COMPARISON:  05/07/2023.    FINDINGS:  Cardiac wires overlie the chest.  Patient is rotated.  Exam quality is limited by soft tissue attenuation of the x-ray beam and portable technique.  Cardiomediastinal silhouette is enlarged and similar to the prior study.  Central vascular congestion with bilateral interstitial opacities, likely interstitial edema or pneumonitis.  Possible small pleural effusions.  No distinct pneumothorax.                                      Assessment/Plan:     Assessment & Plan  Pulmonary edema  Presents with SOB, leg edema, and BNP of 940 concerning for new onset CHF. Without hypoxia on RA.   Continue IV lasix  Echo  Troponin trend/telemetry  Daily weights/strict intake and " output  Essential hypertension  Patient's blood pressure range in the last 24 hours was: BP  Min: 163/104  Max: 184/107.The patient's inpatient anti-hypertensive regimen is listed below:  Current Antihypertensives  furosemide injection 40 mg, 2 times daily, Intravenous  amLODIPine tablet 5 mg, Daily, Oral    Plan  - BP is controlled, no changes needed to their regimen  - continue home amlodipine, begin GDMT if CHF confirmed on echo  Elevated troponin  Presents with troponin of 0.056 in setting of HTN and pulmonary edema. Denies chest pain, EKG without ST elevation  Troponin trend  Telemetry  Echo  Continue aspirin  VTE Risk Mitigation (From admission, onward)           Ordered     IP VTE HIGH RISK PATIENT  Once         07/01/25 1259     Place sequential compression device  Until discontinued         07/01/25 1259                    Discussed with ED physician.     VTE: scd  Code: full  Diet: cardiac  Dispo: pending diuresis and echo  As clarification, on 7/1/2025, patient should be admitted to inpatient services under my care in collaboration with Jose Robertson MD. Floyd La PA-C  Department of Hospital Medicine  [facility] [place] - Emergency Dept

## 2025-07-01 NOTE — ED NOTES
Pt arrived with SOB and cough x5 days. Pt reports he was on the way to PCP for a pulmonology referral due to SOB but decided to come to ED. Pt denies hx of CHF, afib. Pt 94-96% ra. Pt respirations approx 22-27 per min. Pt denies cp, fever, abdominal pain. Pt did not take BP meds today due to SOB. Pt aaox4, connected to continuous pulse ox, cardiac and bp monitor. Bilateral lower leg swelling noted.

## 2025-07-01 NOTE — ED PROVIDER NOTES
Encounter Date: 7/1/2025       History     Chief Complaint   Patient presents with    Shortness of Breath     Pt presents to the ED with c/o shortness of breath with exertion and productive cough x 2 days. Pt also c/o anterior chest pain.        64-year-old male past medical history for arthritis, hypertension presenting secondary to shortness of breath and productive cough ongoing for the past 3 days along with tightness and pain in his chest whenever he coughs or ambulates.  Hard to laid down flat.  Has not taken his blood pressure medications today.  No fevers chills.  Having some congestion.  Leg swelling that he noticed today.  No change in his medications.  States compliant with his medications other than today.  No urinary complaints or abdominal pain or nausea vomiting.  Feels tired and fatigued.        Review of patient's allergies indicates:  No Known Allergies  Past Medical History:   Diagnosis Date    Arthritis     GIB (gastrointestinal bleeding) 12/03/2021    Hypertension      Past Surgical History:   Procedure Laterality Date    ESOPHAGOGASTRODUODENOSCOPY N/A 12/6/2021    Procedure: EGD (ESOPHAGOGASTRODUODENOSCOPY);  Surgeon: Pritesh Snow MD;  Location: [facility];  Service: Endoscopy;  Laterality: N/A;    ESOPHAGOGASTRODUODENOSCOPY N/A 3/21/2022    Procedure: ESOPHAGOGASTRODUODENOSCOPY (EGD);  Surgeon: Zeus Kwon MD;  Location: [facility];  Service: Endoscopy;  Laterality: N/A;  Fully vaccinated (pt will bring card in day of procedure), instr mailed -ml    HERNIA REPAIR  1990'S    HIATAL     Family History   Problem Relation Name Age of Onset    Colon cancer Neg Hx      Esophageal cancer Neg Hx       Social History[1]  Review of Systems   Constitutional:  Positive for fatigue. Negative for fever.   HENT:  Negative for sore throat.    Respiratory:  Positive for cough, chest tightness and shortness of breath.    Cardiovascular:  Positive for chest pain.   Gastrointestinal:  Negative for nausea.    Genitourinary:  Negative for dysuria.   Musculoskeletal:  Negative for back pain.   Skin:  Negative for rash.   Neurological:  Positive for weakness.   Hematological:  Does not bruise/bleed easily.       Physical Exam     Initial Vitals [07/01/25 1027]   BP Pulse Resp Temp SpO2   (!) 180/112 107 (!) 24 98.5 °F (36.9 °C) 95 %      MAP       --         Physical Exam    Nursing note and vitals reviewed.  Constitutional: He appears well-developed and well-nourished.   HENT:   Head: Normocephalic and atraumatic. Mouth/Throat: Oropharynx is clear and moist.   Eyes: EOM are normal. Pupils are equal, round, and reactive to light.   Neck:   Normal range of motion.  Cardiovascular:  Normal rate and regular rhythm.           Pulmonary/Chest: No stridor.   Crackles and wheezes bilaterally.   Abdominal: Abdomen is soft. Bowel sounds are normal. He exhibits no distension. There is no abdominal tenderness.   Musculoskeletal:         General: No tenderness. Normal range of motion.      Cervical back: Normal range of motion.      Comments: Two to 3+ pitting edema bilateral lower extremities.     Neurological: He is alert and oriented to person, place, and time. He has normal strength. GCS score is 15. GCS eye subscore is 4. GCS verbal subscore is 5. GCS motor subscore is 6.   Skin: Skin is warm and dry. Capillary refill takes less than 2 seconds.   Psychiatric: He has a normal mood and affect. Thought content normal.         ED Course   Procedures  Labs Reviewed   COMPREHENSIVE METABOLIC PANEL - Abnormal       Result Value    Sodium 139      Potassium 4.1      Chloride 106      CO2 21 (*)     Glucose 97      BUN 11      Creatinine 1.1      Calcium 9.2      Protein Total 7.5      Albumin 3.8      Bilirubin Total 1.5 (*)     ALP 89      AST 20      ALT 8 (*)     Anion Gap 12      eGFR >60     TROPONIN I - Abnormal    Troponin-I 0.056 (*)    B-TYPE NATRIURETIC PEPTIDE - Abnormal     (*)    CBC WITH DIFFERENTIAL - Abnormal     "WBC 8.55      RBC 5.04      HGB 14.3      HCT 46.1      MCV 92      MCH 28.4      MCHC 31.0 (*)     RDW 13.1      Platelet Count 150      MPV        Nucleated RBC 0      Neut % 76.1 (*)     Lymph % 13.2 (*)     Mono % 8.5      Eos % 1.4      Basophil % 0.6      Imm Grans % 0.2      Neut # 6.50      Lymph # 1.13      Mono # 0.73      Eos # 0.12      Baso # 0.05      Imm Grans # 0.02     TROPONIN I - Abnormal    Troponin-I 0.056 (*)    CBC W/ AUTO DIFFERENTIAL    Narrative:     The following orders were created for panel order CBC auto differential.  Procedure                               Abnormality         Status                     ---------                               -----------         ------                     CBC with Differential[[id number]]       Abnormal            Final result                 Please view results for these tests on the individual orders.   HEMOGLOBIN A1C   MAGNESIUM   POCT INFLUENZA A/B MOLECULAR    POC Molecular Influenza A Ag Negative      POC Molecular Influenza B Ag Negative       Acceptable Yes     SARS-COV-2 RDRP GENE    POC Rapid COVID Negative       Acceptable Yes       EKG Readings: (Independently Interpreted)   EKG done at 10:21 a.m. showing normal sinus rhythm with a rate 96.  Wavy baseline.  PVCs.  Normal axis.  QRS is 104 and QTC is 459.  Wavy baseline.  Nonspecific EKG.  No ST elevation.       Imaging Results              X-Ray Chest AP Portable (Final result)  Result time 07/01/25 10:59:34      Final result by Tristan Lawrence MD (07/01/25 10:59:34)                   Impression:      Cardiomegaly with bilateral interstitial opacities and small pleural effusions.  Suggest correlation for CHF exacerbation and/or volume overload.      Electronically signed by: Tristan Lawrence MD  Date:    07/01/2025  Time:    10:59               Narrative:    EXAMINATION:  XR CHEST AP PORTABLE    CLINICAL HISTORY:  Provided history is "  Shortness of " "breath".    TECHNIQUE:  One view of the chest.    COMPARISON:  05/07/2023.    FINDINGS:  Cardiac wires overlie the chest.  Patient is rotated.  Exam quality is limited by soft tissue attenuation of the x-ray beam and portable technique.  Cardiomediastinal silhouette is enlarged and similar to the prior study.  Central vascular congestion with bilateral interstitial opacities, likely interstitial edema or pneumonitis.  Possible small pleural effusions.  No distinct pneumothorax.                                       Medications   sodium chloride 0.9% flush 10 mL (has no administration in time range)   furosemide injection 40 mg (has no administration in time range)   acetaminophen tablet 650 mg (has no administration in time range)   senna-docusate 8.6-50 mg per tablet 1 tablet (has no administration in time range)   melatonin tablet 6 mg (has no administration in time range)   ondansetron disintegrating tablet 4 mg (has no administration in time range)   benzonatate capsule 100 mg (has no administration in time range)   calcium carbonate 200 mg calcium (500 mg) chewable tablet 500 mg (has no administration in time range)   aspirin chewable tablet 81 mg (has no administration in time range)   amLODIPine tablet 5 mg (has no administration in time range)   gabapentin capsule 600 mg (has no administration in time range)   furosemide injection 40 mg (40 mg Intravenous Given 7/1/25 1102)   nitroGLYCERIN 2% TD oint ointment 1 inch (1 inch Topical (Top) Given 7/1/25 1102)   albuterol-ipratropium 2.5 mg-0.5 mg/3 mL nebulizer solution 3 mL (3 mLs Nebulization Given 7/1/25 1109)   amLODIPine tablet 5 mg (5 mg Oral Given 7/1/25 1119)   aspirin tablet 325 mg (325 mg Oral Given 7/1/25 1212)   hydrALAZINE injection 10 mg (10 mg Intravenous Given 7/1/25 1212)     Medical Decision Making  Pt presenting 2/2 rales in fluid overload and shortness of breath consistent with CHF exacerbation.  Patient is given IV Lasix.  Considered " nitroglycerin.  If worsening respiratory status will consider BiPAP and/or intubation.    Also considered but less likely:  Acs: ekg doesn't show stemi. Troponin ordered  Pna: symptoms bilaterally and no fevers.   Bronchitis: considered but hpi most c/w CHF  COPD:  Considered but less likely  Pneumothorax: bilateral breath sounds    Labs show elevated troponin BNP.  Breathing treatments and nitro paste.  Blood pressure still high after giving home amlodipine.  IV hydralazine with decrease in blood pressure.  Patient was admitted further workup management.    Please put in thirty-five minutes of critical care due to patient having a high risk of respiratory failure.   Separate from teaching and exclusive of procedure and ekg time  Includes:  Time at bedside  Time reviewing test results  Time discussing case with staff  Time documenting the medical record  Time spent with family members  Time spent with consults  Management          Amount and/or Complexity of Data Reviewed  Labs: ordered.    Risk  OTC drugs.  Prescription drug management.  Decision regarding hospitalization.                                      Clinical Impression:  Final diagnoses:  [R06.02] SOB (shortness of breath)  [R06.02] Shortness of breath  [I50.9] Congestive heart failure, unspecified HF chronicity, unspecified heart failure type (Primary)  [R03.0] Elevated blood pressure reading          ED Disposition Condition    Admit                       [1]   Social History  Tobacco Use    Smoking status: Never    Smokeless tobacco: Never   Substance Use Topics    Alcohol use: Never    Drug use: Never        Scott Ball MD  07/01/25 0084

## 2025-07-01 NOTE — ASSESSMENT & PLAN NOTE
Presents with SOB, leg edema, and BNP of 940 concerning for new onset CHF. Without hypoxia on RA.   Continue IV lasix  Echo  Troponin trend/telemetry  Daily weights/strict intake and output

## 2025-07-01 NOTE — ED NOTES
Pt sitting up, respirations have improved. Pt reports he is feeling better. Denies any needs at this time. Side rails upx 2, call bell within reach. Will continue to monitor

## 2025-07-01 NOTE — HPI
Dru Baron 64 y.o. male with HTN and arthritis presents to the hospital with a chief complaint of shortness breath.  He reports 5 days of shortness a breath with associated lower extremity edema nonproductive cough.  He finds his shortness breath worsens with lying flat as improved with IV Lasix in the emergency room.  He has never experienced symptoms such as this in the past.  He denies any history of heart failure.  He has been compliant with his home amlodipine.  He denies fever nausea vomiting abdominal pain melena hematuria hematemesis dizziness and syncope.    In the ED, afebrile without leukocytosis EKG without ST elevation chest x-ray with pulmonary edema troponin 0.056  COVID and flu negative.

## 2025-07-01 NOTE — PLAN OF CARE
Case Management Assessment     PCP: Eric Oconnor  Pharmacy: [facility]    Patient Arrived From: home   Existing Help at Home: spouse    Barriers to Discharge: none    Discharge Plan:    A. Home with family   B. Home with family     07/01/25 1232   Discharge Planning   Assessment Type Discharge Planning Brief Assessment   Resource/Environmental Concerns none   Support Systems Spouse/significant other   Equipment Currently Used at Home none   Current Living Arrangements home   Patient/Family Anticipates Transition to home with family   Patient/Family Anticipated Services at Transition none   DME Needed Upon Discharge  none   Discharge Plan A Home with family   Discharge Plan B Home with family

## 2025-07-01 NOTE — Clinical Note
The catheter was inserted into the and was inserted over the wire into the pulmonary capillary wedge.

## 2025-07-01 NOTE — SUBJECTIVE & OBJECTIVE
Past Medical History:   Diagnosis Date    Arthritis     GIB (gastrointestinal bleeding) 12/03/2021    Hypertension        Past Surgical History:   Procedure Laterality Date    ESOPHAGOGASTRODUODENOSCOPY N/A 12/6/2021    Procedure: EGD (ESOPHAGOGASTRODUODENOSCOPY);  Surgeon: Pritesh Snow MD;  Location: [facility];  Service: Endoscopy;  Laterality: N/A;    ESOPHAGOGASTRODUODENOSCOPY N/A 3/21/2022    Procedure: ESOPHAGOGASTRODUODENOSCOPY (EGD);  Surgeon: Zeus Kwon MD;  Location: [facility];  Service: Endoscopy;  Laterality: N/A;  Fully vaccinated (pt will bring card in day of procedure), instr mailed -ml    HERNIA REPAIR  1990'S    HIATAL       Review of patient's allergies indicates:  No Known Allergies    No current facility-administered medications on file prior to encounter.     Current Outpatient Medications on File Prior to Encounter   Medication Sig    amLODIPine (NORVASC) 5 MG tablet Take 1 tablet (5 mg total) by mouth once daily.    gabapentin (NEURONTIN) 600 MG tablet Take 600 mg by mouth 2 (two) times a day.    ammonium lactate 12 % Crea Apply twice daily to affected parts both feet as needed.    ammonium lactate 12 % Crea Apply 1 application topically 2 (two) times daily.    fluticasone propionate (FLONASE) 50 mcg/actuation nasal spray USE 2 SPRAYS IN EACH NOSTRIL ONCE DAILY    fluticasone propionate (FLONASE) 50 mcg/actuation nasal spray by Each Nostril route.    pantoprazole (PROTONIX) 40 MG tablet Take 1 tablet (40 mg total) by mouth 2 (two) times daily.    [DISCONTINUED] ciclopirox (PENLAC) 8 % Soln Apply topically nightly.    [DISCONTINUED] ciclopirox (PENLAC) 8 % Soln     [DISCONTINUED] gabapentin (NEURONTIN) 600 MG tablet     [DISCONTINUED] hydroCHLOROthiazide (HYDRODIURIL) 25 MG tablet Take 25 mg by mouth once daily.    [DISCONTINUED] ibuprofen (ADVIL,MOTRIN) 800 MG tablet Take 800 mg by mouth 3 (three) times daily as needed.    [DISCONTINUED] ketoconazole (NIZORAL) 2 % cream Apply topically  once daily.    [DISCONTINUED] levocetirizine (XYZAL) 5 MG tablet SMARTSI Tablet(s) By Mouth Every Evening    [DISCONTINUED] losartan (COZAAR) 100 MG tablet Take 100 mg by mouth once daily.     Family History    None       Tobacco Use    Smoking status: Never    Smokeless tobacco: Never   Substance and Sexual Activity    Alcohol use: Never    Drug use: Never    Sexual activity: Not on file     Review of Systems   Constitutional:  Negative for chills and fever.   HENT:  Negative for nosebleeds and tinnitus.    Eyes:  Negative for photophobia and visual disturbance.   Respiratory:  Positive for shortness of breath. Negative for wheezing.    Cardiovascular:  Positive for leg swelling. Negative for chest pain and palpitations.   Gastrointestinal:  Negative for abdominal distention, nausea and vomiting.   Genitourinary:  Negative for dysuria, flank pain and hematuria.   Musculoskeletal:  Negative for gait problem and joint swelling.   Skin:  Negative for rash and wound.   Neurological:  Negative for seizures and syncope.     Objective:     Vital Signs (Most Recent):  Temp: 98.5 °F (36.9 °C) (25 1027)  Pulse: 102 (25 1248)  Resp: 18 (25 1109)  BP: (!) 173/93 (25 1248)  SpO2: 95 % (25 1249) Vital Signs (24h Range):  Temp:  [98.5 °F (36.9 °C)] 98.5 °F (36.9 °C)  Pulse:  [] 102  Resp:  [16-24] 18  SpO2:  [94 %-96 %] 95 %  BP: (163-184)/() 173/93     Weight: 136.1 kg (300 lb)  Body mass index is 38.52 kg/m².     Physical Exam  Vitals and nursing note reviewed.   Constitutional:       General: He is not in acute distress.     Appearance: He is well-developed.   HENT:      Head: Normocephalic and atraumatic.      Right Ear: External ear normal.      Left Ear: External ear normal.   Eyes:      General:         Right eye: No discharge.         Left eye: No discharge.      Conjunctiva/sclera: Conjunctivae normal.   Neck:      Thyroid: No thyromegaly.   Cardiovascular:      Rate and  "Rhythm: Normal rate and regular rhythm.      Heart sounds: No murmur heard.  Pulmonary:      Effort: Pulmonary effort is normal. No respiratory distress.      Breath sounds: Rales present.      Comments: On RA  Abdominal:      General: Bowel sounds are normal. There is no distension.      Palpations: Abdomen is soft. There is no mass.      Tenderness: There is no abdominal tenderness.   Musculoskeletal:         General: No deformity.      Cervical back: Normal range of motion.      Right lower leg: Edema present.      Left lower leg: Edema present.   Skin:     General: Skin is warm and dry.   Neurological:      Mental Status: He is alert and oriented to person, place, and time.   Psychiatric:         Behavior: Behavior normal.                Significant Labs: CBC:   Recent Labs   Lab 07/01/25  1102   WBC 8.55   HGB 14.3   HCT 46.1        CMP:   Recent Labs   Lab 07/01/25  1102      K 4.1      CO2 21*   GLU 97   BUN 11   CREATININE 1.1   CALCIUM 9.2   PROT 7.5   ALBUMIN 3.8   BILITOT 1.5*   ALKPHOS 89   AST 20   ALT 8*   ANIONGAP 12     Cardiac Markers:   Recent Labs   Lab 07/01/25  1102   *     Troponin:   Recent Labs   Lab 07/01/25  1102   TROPONINI 0.056*       Significant Imaging:   Imaging Results              X-Ray Chest AP Portable (Final result)  Result time 07/01/25 10:59:34      Final result by Tristan Lawrence MD (07/01/25 10:59:34)                   Impression:      Cardiomegaly with bilateral interstitial opacities and small pleural effusions.  Suggest correlation for CHF exacerbation and/or volume overload.      Electronically signed by: Tristan Lawrence MD  Date:    07/01/2025  Time:    10:59               Narrative:    EXAMINATION:  XR CHEST AP PORTABLE    CLINICAL HISTORY:  Provided history is "  Shortness of breath".    TECHNIQUE:  One view of the chest.    COMPARISON:  05/07/2023.    FINDINGS:  Cardiac wires overlie the chest.  Patient is rotated.  Exam quality is " limited by soft tissue attenuation of the x-ray beam and portable technique.  Cardiomediastinal silhouette is enlarged and similar to the prior study.  Central vascular congestion with bilateral interstitial opacities, likely interstitial edema or pneumonitis.  Possible small pleural effusions.  No distinct pneumothorax.

## 2025-07-01 NOTE — PHARMACY MED REC
"07/01/2025 Patient confirmed medications at bedside. He expressed no longer taking medications and is requesting a refill for Protonix (pantoprazole) 40 Mg      Admission Medication History     The home medication history was taken by Sly Malagon.    You may go to "Admission" then "Reconcile Home Medications" tabs to review and/or act upon these items.     The home medication list has been updated by the Pharmacy department.   Please read ALL comments highlighted in yellow.   Please address this information as you see fit.    Feel free to contact us if you have any questions or require assistance.      The medications listed below were removed from the home medication list. Please reorder if appropriate:  Patient reports no longer taking the following medication(s):  Ciclopirox (Penlac) 8% Solution  Hydrochorothiazide 25 Mg  Ibuprofen 800 Mg  Ketoconazole (Nizoral) Cream 2 %  Levocetizirine (xyzal) 5 Mg  Losartan (cozaar) 100 Mg        Medications listed below were obtained from: Patient/family and Analytic software- [employer]  (Not in a hospital admission)          Sly Malagon [facility]   Medication Access Specialist [phone]                   .          "

## 2025-07-01 NOTE — PLAN OF CARE
Case Management Assessment      PCP: Eric Oconnor  Pharmacy: [facility]     Patient Arrived From: home   Existing Help at Home: spouse     Barriers to Discharge: none     Discharge Plan:               A. Home with family              B. Home with family     07/01/25 1302   Discharge Assessment   Assessment Type Discharge Planning Assessment   Confirmed/corrected address, phone number and insurance Yes   Confirmed Demographics Correct on Facesheet   Source of Information patient   Communicated YADIEL with patient/caregiver Date not available/Unable to determine   People in Home spouse   Name(s) of People in Home Christel Baron (Spouse)  [phone] (Mobile)   Do you expect to return to your current living situation? Yes   Do you have help at home or someone to help you manage your care at home? Yes   Who are your caregiver(s) and their phone number(s)? Christel Baron (Spouse)  [phone] (Mobile)   Prior to hospitilization cognitive status: Alert/Oriented   Current cognitive status: Alert/Oriented   Walking or Climbing Stairs Difficulty no   Dressing/Bathing Difficulty no   Equipment Currently Used at Home none   Readmission within 30 days? No   Patient currently being followed by outpatient case management? No   Do you currently have service(s) that help you manage your care at home? No   Do you take prescription medications? Yes   Do you have prescription coverage? Yes   Do you have any problems affording any of your prescribed medications? No   Is the patient taking medications as prescribed? yes   Who is going to help you get home at discharge? Christel Baron (Spouse)  [phone] (Mobile)   How do you get to doctors appointments? family or friend will provide   Are you on dialysis? No   Do you take coumadin? No   Discharge Plan A Home with family   Discharge Plan B Home with family   DME Needed Upon Discharge  none   Discharge Plan discussed with: Patient   Transition of Care Barriers None

## 2025-07-01 NOTE — NURSING TRANSFER
Nursing Transfer Note      7/1/2025   2:31 PM    Reason patient is being transferred: Pulmonary edema    Transfer From: ED    Transfer via stretcher    Transfer with cardiac monitoring    Telemetry: Box Number [id number]  Order for Tele Monitor? Yes    Medicines sent: none    Any special needs or follow-up needed: none    Patient belongings transferred with patient: Yes    Chart send with patient: No    Patient reassessed at: 7/1/25 @ 1420     Upon arrival to floor: patient oriented to room, call bell in reach, and bed in lowest position

## 2025-07-01 NOTE — ASSESSMENT & PLAN NOTE
Presents with troponin of 0.056 in setting of HTN and pulmonary edema. Denies chest pain, EKG without ST elevation  Troponin trend  Telemetry  Echo  Continue aspirin

## 2025-07-01 NOTE — NURSING
[facility] [place]  Nurses Note -- 4 Eyes      7/1/2025       Skin assessed on: Admit      [x] No Pressure Injuries Present    [x]Prevention Measures Documented    [] Yes LDA  for Pressure Injury Previously documented     [] Yes New Pressure Injury Discovered   [] LDA for New Pressure Injury Added      Attending RN:  Ajit Simons RN     Second RN:  ANI Addison

## 2025-07-01 NOTE — ASSESSMENT & PLAN NOTE
Patient's blood pressure range in the last 24 hours was: BP  Min: 163/104  Max: 184/107.The patient's inpatient anti-hypertensive regimen is listed below:  Current Antihypertensives  furosemide injection 40 mg, 2 times daily, Intravenous  amLODIPine tablet 5 mg, Daily, Oral    Plan  - BP is controlled, no changes needed to their regimen  - continue home amlodipine, begin GDMT if CHF confirmed on echo

## 2025-07-02 ENCOUNTER — DOCUMENTATION ONLY (OUTPATIENT)
Facility: CLINIC | Age: 65
End: 2025-07-02
Payer: MEDICARE

## 2025-07-02 PROBLEM — I50.21 ACUTE SYSTOLIC CONGESTIVE HEART FAILURE: Status: ACTIVE | Noted: 2025-07-02

## 2025-07-02 PROBLEM — I48.20 CHRONIC ATRIAL FIBRILLATION: Status: ACTIVE | Noted: 2025-07-02

## 2025-07-02 LAB
ANION GAP (OHS): 13 MMOL/L (ref 8–16)
AORTIC SIZE INDEX (SOV): 1.9 CM/M2
AORTIC SIZE INDEX: 1.7 CM/M2
ASCENDING AORTA: 4.3 CM
AV INDEX (PROSTH): 0.72
AV MEAN GRADIENT: 5 MMHG
AV PEAK GRADIENT: 9 MMHG
AV VALVE AREA BY VELOCITY RATIO: 2.7 CM²
AV VALVE AREA: 3.3 CM²
AV VELOCITY RATIO: 0.6
BSA FOR ECHO PROCEDURE: 2.67 M2
BUN SERPL-MCNC: 10 MG/DL (ref 8–23)
CALCIUM SERPL-MCNC: 9.1 MG/DL (ref 8.7–10.5)
CHLORIDE SERPL-SCNC: 104 MMOL/L (ref 95–110)
CHOLEST SERPL-MCNC: 146 MG/DL (ref 120–199)
CHOLEST/HDLC SERPL: 3.2 {RATIO} (ref 2–5)
CO2 SERPL-SCNC: 24 MMOL/L (ref 23–29)
CREAT SERPL-MCNC: 1 MG/DL (ref 0.5–1.4)
CV ECHO LV RWT: 0.55 CM
DOP CALC AO PEAK VEL: 1.5 M/S
DOP CALC AO VTI: 22.9 CM
DOP CALC LVOT AREA: 4.5 CM2
DOP CALC LVOT DIAMETER: 2.4 CM
DOP CALC LVOT PEAK VEL: 0.9 M/S
DOP CALC LVOT STROKE VOLUME: 75.1 CM3
DOP CALC MV VTI: 27.9 CM
DOP CALCLVOT PEAK VEL VTI: 16.6 CM
EAG (OHS): 91 MG/DL (ref 68–131)
ECHO LV POSTERIOR WALL: 1.6 CM (ref 0.6–1.1)
FRACTIONAL SHORTENING: 8.6 % (ref 28–44)
GFR SERPLBLD CREATININE-BSD FMLA CKD-EPI: >60 ML/MIN/1.73/M2
GLUCOSE SERPL-MCNC: 95 MG/DL (ref 70–110)
HBA1C MFR BLD: 4.8 % (ref 4–5.6)
HDLC SERPL-MCNC: 46 MG/DL (ref 40–75)
HDLC SERPL: 31.5 % (ref 20–50)
INTERVENTRICULAR SEPTUM: 1.8 CM (ref 0.6–1.1)
IVC DIAMETER: 2.91 CM
LA MAJOR: 6.6 CM
LA MINOR: 7.4 CM
LA WIDTH: 6.3 CM
LDLC SERPL CALC-MCNC: 87.4 MG/DL (ref 63–159)
LEFT ATRIUM SIZE: 4.9 CM
LEFT ATRIUM VOLUME INDEX: 71 ML/M2
LEFT ATRIUM VOLUME: 183 CM3
LEFT INTERNAL DIMENSION IN SYSTOLE: 5.3 CM (ref 2.1–4)
LEFT VENTRICLE DIASTOLIC VOLUME INDEX: 63.57 ML/M2
LEFT VENTRICLE DIASTOLIC VOLUME: 164 ML
LEFT VENTRICLE MASS INDEX: 188.4 G/M2
LEFT VENTRICLE SYSTOLIC VOLUME INDEX: 51.6 ML/M2
LEFT VENTRICLE SYSTOLIC VOLUME: 133 ML
LEFT VENTRICULAR INTERNAL DIMENSION IN DIASTOLE: 5.8 CM (ref 3.5–6)
LEFT VENTRICULAR MASS: 486.1 G
LVED V (TEICH): 163.68 ML
LVES V (TEICH): 132.59 ML
LVOT MG: 1.87 MMHG
LVOT MV: 0.64 CM/S
MV MEAN GRADIENT: 3 MMHG
MV PEAK GRADIENT: 6 MMHG
MV VALVE AREA BY CONTINUITY EQUATION: 2.69 CM2
NONHDLC SERPL-MCNC: 100 MG/DL
OHS CV RV/LV RATIO: 0.57 CM
PISA TR MAX VEL: 2.3 M/S
POTASSIUM SERPL-SCNC: 3.3 MMOL/L (ref 3.5–5.1)
PV PEAK GRADIENT: 6 MMHG
PV PEAK VELOCITY: 1.19 M/S
RA MAJOR: 6.56 CM
RA PRESSURE ESTIMATED: 8 MMHG
RA WIDTH: 4 CM
RIGHT VENTRICLE DIASTOLIC BASEL DIMENSION: 3.3 CM
RIGHT VENTRICULAR END-DIASTOLIC DIMENSION: 3.3 CM
RV TB RVSP: 10 MMHG
RV TISSUE DOPPLER FREE WALL SYSTOLIC VELOCITY 1 (APICAL 4 CHAMBER VIEW): 12.06 CM/S
SINUS: 4.8 CM
SODIUM SERPL-SCNC: 141 MMOL/L (ref 136–145)
STJ: 4.4 CM
TR MAX PG: 21 MMHG
TRICUSPID ANNULAR PLANE SYSTOLIC EXCURSION: 1.2 CM
TRIGL SERPL-MCNC: 63 MG/DL (ref 30–150)
TSH SERPL-ACNC: 2.21 UIU/ML (ref 0.4–4)
TV REST PULMONARY ARTERY PRESSURE: 29 MMHG
Z-SCORE OF LEFT VENTRICULAR DIMENSION IN END DIASTOLE: -10.98
Z-SCORE OF LEFT VENTRICULAR DIMENSION IN END SYSTOLE: -5.2

## 2025-07-02 PROCEDURE — 63600175 PHARM REV CODE 636 W HCPCS: Performed by: PHYSICIAN ASSISTANT

## 2025-07-02 PROCEDURE — 25000003 PHARM REV CODE 250: Performed by: INTERNAL MEDICINE

## 2025-07-02 PROCEDURE — C1887 CATHETER, GUIDING: HCPCS | Performed by: INTERNAL MEDICINE

## 2025-07-02 PROCEDURE — B2111ZZ FLUOROSCOPY OF MULTIPLE CORONARY ARTERIES USING LOW OSMOLAR CONTRAST: ICD-10-PCS | Performed by: INTERNAL MEDICINE

## 2025-07-02 PROCEDURE — 84443 ASSAY THYROID STIM HORMONE: CPT | Performed by: PHYSICIAN ASSISTANT

## 2025-07-02 PROCEDURE — 99152 MOD SED SAME PHYS/QHP 5/>YRS: CPT | Performed by: INTERNAL MEDICINE

## 2025-07-02 PROCEDURE — 80048 BASIC METABOLIC PNL TOTAL CA: CPT | Performed by: PHYSICIAN ASSISTANT

## 2025-07-02 PROCEDURE — 99223 1ST HOSP IP/OBS HIGH 75: CPT | Mod: 25,,, | Performed by: INTERNAL MEDICINE

## 2025-07-02 PROCEDURE — 36415 COLL VENOUS BLD VENIPUNCTURE: CPT | Performed by: PHYSICIAN ASSISTANT

## 2025-07-02 PROCEDURE — 80061 LIPID PANEL: CPT | Performed by: PHYSICIAN ASSISTANT

## 2025-07-02 PROCEDURE — C1760 CLOSURE DEV, VASC: HCPCS | Performed by: INTERNAL MEDICINE

## 2025-07-02 PROCEDURE — 25000003 PHARM REV CODE 250: Performed by: PHYSICIAN ASSISTANT

## 2025-07-02 PROCEDURE — 25000003 PHARM REV CODE 250: Performed by: STUDENT IN AN ORGANIZED HEALTH CARE EDUCATION/TRAINING PROGRAM

## 2025-07-02 PROCEDURE — 63600175 PHARM REV CODE 636 W HCPCS: Performed by: INTERNAL MEDICINE

## 2025-07-02 PROCEDURE — C1769 GUIDE WIRE: HCPCS | Performed by: INTERNAL MEDICINE

## 2025-07-02 PROCEDURE — 93460 R&L HRT ART/VENTRICLE ANGIO: CPT | Performed by: INTERNAL MEDICINE

## 2025-07-02 PROCEDURE — 99152 MOD SED SAME PHYS/QHP 5/>YRS: CPT | Mod: ,,, | Performed by: INTERNAL MEDICINE

## 2025-07-02 PROCEDURE — 99153 MOD SED SAME PHYS/QHP EA: CPT | Performed by: INTERNAL MEDICINE

## 2025-07-02 PROCEDURE — 4A023N8 MEASUREMENT OF CARDIAC SAMPLING AND PRESSURE, BILATERAL, PERCUTANEOUS APPROACH: ICD-10-PCS | Performed by: INTERNAL MEDICINE

## 2025-07-02 PROCEDURE — 93460 R&L HRT ART/VENTRICLE ANGIO: CPT | Mod: 26,,, | Performed by: INTERNAL MEDICINE

## 2025-07-02 PROCEDURE — C1751 CATH, INF, PER/CENT/MIDLINE: HCPCS | Performed by: INTERNAL MEDICINE

## 2025-07-02 PROCEDURE — 11000001 HC ACUTE MED/SURG PRIVATE ROOM

## 2025-07-02 PROCEDURE — C1894 INTRO/SHEATH, NON-LASER: HCPCS | Performed by: INTERNAL MEDICINE

## 2025-07-02 DEVICE — ANGIO-SEAL VIP VASCULAR CLOSURE DEVICE
Type: IMPLANTABLE DEVICE | Site: FEMORAL | Status: FUNCTIONAL
Brand: ANGIO-SEAL

## 2025-07-02 RX ORDER — LIDOCAINE HYDROCHLORIDE 10 MG/ML
INJECTION, SOLUTION EPIDURAL; INFILTRATION; INTRACAUDAL; PERINEURAL
Status: DISCONTINUED | OUTPATIENT
Start: 2025-07-02 | End: 2025-07-02 | Stop reason: HOSPADM

## 2025-07-02 RX ORDER — MIDAZOLAM HYDROCHLORIDE 1 MG/ML
INJECTION, SOLUTION INTRAMUSCULAR; INTRAVENOUS
Status: DISCONTINUED | OUTPATIENT
Start: 2025-07-02 | End: 2025-07-02 | Stop reason: HOSPADM

## 2025-07-02 RX ORDER — HYDRALAZINE HYDROCHLORIDE 20 MG/ML
INJECTION INTRAMUSCULAR; INTRAVENOUS
Status: DISCONTINUED | OUTPATIENT
Start: 2025-07-02 | End: 2025-07-02 | Stop reason: HOSPADM

## 2025-07-02 RX ORDER — FENTANYL CITRATE 50 UG/ML
INJECTION, SOLUTION INTRAMUSCULAR; INTRAVENOUS
Status: DISCONTINUED | OUTPATIENT
Start: 2025-07-02 | End: 2025-07-02 | Stop reason: HOSPADM

## 2025-07-02 RX ORDER — MIDAZOLAM HYDROCHLORIDE 1 MG/ML
INJECTION INTRAMUSCULAR; INTRAVENOUS
Status: DISCONTINUED | OUTPATIENT
Start: 2025-07-02 | End: 2025-07-02 | Stop reason: HOSPADM

## 2025-07-02 RX ORDER — POTASSIUM CHLORIDE 20 MEQ/1
40 TABLET, EXTENDED RELEASE ORAL DAILY
Status: DISCONTINUED | OUTPATIENT
Start: 2025-07-02 | End: 2025-07-04 | Stop reason: HOSPADM

## 2025-07-02 RX ORDER — HYDROCODONE BITARTRATE AND ACETAMINOPHEN 5; 325 MG/1; MG/1
1 TABLET ORAL EVERY 4 HOURS PRN
Refills: 0 | Status: DISCONTINUED | OUTPATIENT
Start: 2025-07-02 | End: 2025-07-04 | Stop reason: HOSPADM

## 2025-07-02 RX ORDER — ONDANSETRON 8 MG/1
8 TABLET, ORALLY DISINTEGRATING ORAL EVERY 8 HOURS PRN
Status: DISCONTINUED | OUTPATIENT
Start: 2025-07-02 | End: 2025-07-02 | Stop reason: SDUPTHER

## 2025-07-02 RX ORDER — PANTOPRAZOLE SODIUM 40 MG/1
40 TABLET, DELAYED RELEASE ORAL DAILY
Status: DISCONTINUED | OUTPATIENT
Start: 2025-07-02 | End: 2025-07-04 | Stop reason: HOSPADM

## 2025-07-02 RX ORDER — ACETAMINOPHEN 325 MG/1
650 TABLET ORAL EVERY 4 HOURS PRN
Status: DISCONTINUED | OUTPATIENT
Start: 2025-07-02 | End: 2025-07-02 | Stop reason: SDUPTHER

## 2025-07-02 RX ORDER — SODIUM CHLORIDE 0.9 % (FLUSH) 0.9 %
10 SYRINGE (ML) INJECTION
Status: DISCONTINUED | OUTPATIENT
Start: 2025-07-02 | End: 2025-07-04 | Stop reason: HOSPADM

## 2025-07-02 RX ADMIN — GABAPENTIN 600 MG: 300 CAPSULE ORAL at 08:07

## 2025-07-02 RX ADMIN — SACUBITRIL AND VALSARTAN 1 TABLET: 24; 26 TABLET, FILM COATED ORAL at 08:07

## 2025-07-02 RX ADMIN — HYDROCODONE BITARTRATE AND ACETAMINOPHEN 1 TABLET: 5; 325 TABLET ORAL at 04:07

## 2025-07-02 RX ADMIN — ASPIRIN 81 MG CHEWABLE TABLET 81 MG: 81 TABLET CHEWABLE at 09:07

## 2025-07-02 RX ADMIN — PANTOPRAZOLE SODIUM 40 MG: 40 TABLET, DELAYED RELEASE ORAL at 05:07

## 2025-07-02 RX ADMIN — APIXABAN 5 MG: 5 TABLET, FILM COATED ORAL at 08:07

## 2025-07-02 RX ADMIN — METOPROLOL SUCCINATE 12.5 MG: 25 TABLET, EXTENDED RELEASE ORAL at 10:07

## 2025-07-02 RX ADMIN — FUROSEMIDE 40 MG: 10 INJECTION, SOLUTION INTRAVENOUS at 05:07

## 2025-07-02 RX ADMIN — GABAPENTIN 600 MG: 300 CAPSULE ORAL at 09:07

## 2025-07-02 RX ADMIN — POTASSIUM CHLORIDE 40 MEQ: 1500 TABLET, EXTENDED RELEASE ORAL at 09:07

## 2025-07-02 RX ADMIN — SACUBITRIL AND VALSARTAN 1 TABLET: 24; 26 TABLET, FILM COATED ORAL at 10:07

## 2025-07-02 RX ADMIN — AMLODIPINE BESYLATE 5 MG: 5 TABLET ORAL at 09:07

## 2025-07-02 RX ADMIN — FUROSEMIDE 40 MG: 10 INJECTION, SOLUTION INTRAVENOUS at 09:07

## 2025-07-02 NOTE — PROGRESS NOTES
[facility] Medicine  Progress Note    Patient Name: Dru Baron  MRN: [id number]  Patient Class: IP- Inpatient   Admission Date: 7/1/2025  Length of Stay: 1 days  Attending Physician: Rod Whitaker MD  Primary Care Provider: Eric Oconnor MD        Subjective     Principal Problem:Pulmonary edema        HPI:  Dru Baron 64 y.o. male with HTN and arthritis presents to the hospital with a chief complaint of shortness breath.  He reports 5 days of shortness a breath with associated lower extremity edema nonproductive cough.  He finds his shortness breath worsens with lying flat as improved with IV Lasix in the emergency room.  He has never experienced symptoms such as this in the past.  He denies any history of heart failure.  He has been compliant with his home amlodipine.  He denies fever nausea vomiting abdominal pain melena hematuria hematemesis dizziness and syncope.    In the ED, afebrile without leukocytosis EKG without ST elevation chest x-ray with pulmonary edema troponin 0.056  COVID and flu negative.    Overview/Hospital Course:  Mr. Baron is a 65 yo M admitted with new onset acute CHF exacerbation. Lasix was started. Echo ordered. TTE with HFrEF 30-35%, moderate LV eccentric hypertrophy. Aortic root moderately dilated at 4.8 cm and proximal ascending aorta moderately dilated at 4.3 cm. Cardiology consulted. Started on entresto and Toprol. Plan for ischemic work up.    Interval History: no acute issues, feeling better but still with shortness of breath lying down.     Review of Systems  Objective:     Vital Signs (Most Recent):  Temp: 98.2 °F (36.8 °C) (07/02/25 0757)  Pulse: 91 (07/02/25 1040)  Resp: 18 (07/02/25 0757)  BP: (!) 148/91 (07/02/25 0757)  SpO2: (!) 90 % (07/02/25 0757) Vital Signs (24h Range):  Temp:  [97.5 °F (36.4 °C)-98.7 °F (37.1 °C)] 98.2 °F (36.8 °C)  Pulse:  [] 91  Resp:  [18-20] 18  SpO2:  [90 %-95 %] 90 %  BP: (123-185)/() 148/91      Weight: (!) 151.5 kg (334 lb)  Body mass index is 42.88 kg/m².    Intake/Output Summary (Last 24 hours) at 7/2/2025 1111  Last data filed at 7/2/2025 1054  Gross per 24 hour   Intake 120 ml   Output 2125 ml   Net -2005 ml         Physical Exam  Vitals and nursing note reviewed.   Constitutional:       General: He is not in acute distress.     Appearance: He is well-developed.   HENT:      Head: Normocephalic and atraumatic.      Right Ear: External ear normal.      Left Ear: External ear normal.   Eyes:      General:         Right eye: No discharge.         Left eye: No discharge.      Conjunctiva/sclera: Conjunctivae normal.   Neck:      Thyroid: No thyromegaly.   Cardiovascular:      Rate and Rhythm: Normal rate and regular rhythm.      Heart sounds: No murmur heard.  Pulmonary:      Effort: Pulmonary effort is normal. No respiratory distress.      Comments: On RA  Abdominal:      General: Bowel sounds are normal. There is no distension.      Palpations: Abdomen is soft. There is no mass.      Tenderness: There is no abdominal tenderness.   Musculoskeletal:         General: No deformity.      Cervical back: Normal range of motion.      Right lower leg: Edema present.      Left lower leg: Edema present.   Skin:     General: Skin is warm and dry.   Neurological:      Mental Status: He is alert and oriented to person, place, and time.   Psychiatric:         Behavior: Behavior normal.               Significant Labs: All pertinent labs within the past 24 hours have been reviewed.    Significant Imaging: I have reviewed all pertinent imaging results/findings within the past 24 hours.      Assessment & Plan  Pulmonary edema  Presents with SOB, leg edema, and BNP of 940 concerning for new onset CHF. Without hypoxia on RA.   Continue IV lasix  Echo  Troponin trend/telemetry  Daily weights/strict intake and output  Essential hypertension  Patient's blood pressure range in the last 24 hours was: BP  Min: 123/96  Max:  185/86.The patient's inpatient anti-hypertensive regimen is listed below:  Current Antihypertensives  furosemide injection 40 mg, 2 times daily, Intravenous  amLODIPine tablet 5 mg, Daily, Oral  hydrALAZINE tablet 25 mg, Every 8 hours PRN, Oral  metoprolol succinate (TOPROL-XL) 24 hr split tablet 12.5 mg, Daily, Oral    Plan  - BP is controlled, no changes needed to their regimen  - continue home amlodipine, start entresto/toprol  Elevated troponin  Presents with troponin of 0.056 in setting of HTN and pulmonary edema. Denies chest pain, EKG without ST elevation  Troponin trend  Telemetry  Echo  Continue aspirin  - Cardiology consulted  Acute systolic congestive heart failure  Patient has Systolic (HFrEF) heart failure that is Acute. On presentation their CHF was decompensated. Evidence of decompensated CHF on presentation includes: edema, crackles on lung auscultation, orthopnea, and paroxysmal nocturnal dyspnea (PND). The etiology of their decompensation is likely unknown/salt intake. Most recent BNP and echo results are listed below.  Recent Labs     07/01/25  1102   *     Latest ECHO  Results for orders placed during the hospital encounter of 07/01/25    Echo    Interpretation Summary    Left Ventricle: The left ventricle is mildly dilated. There is moderate eccentric hypertrophy. There is moderately reduced systolic function with a visually estimated ejection fraction of 30 - 35%.    Right Ventricle: The right ventricle is normal in size measuring 3.3 cm. Systolic function is normal.    Left Atrium: The left atrium is severely dilated    Right Atrium: The right atrium is mildly dilated .    Aortic Valve: There is mild aortic regurgitation.    Mitral Valve: There is mild regurgitation.    Aorta: The aortic root is moderately dilated measuring 4.8 cm. The proximal ascending aorta is moderately dilated measuring 4.3 cm.    Pulmonary Artery: The estimated pulmonary artery systolic pressure is 29 mmHg.     IVC/SVC: Intermediate venous pressure at 8 mmHg.    Pericardium: There is a trivial posterior effusion.    Current Heart Failure Medications  furosemide injection 40 mg, 2 times daily, Intravenous  hydrALAZINE tablet 25 mg, Every 8 hours PRN, Oral  sacubitriL-valsartan 24-26 mg per tablet 1 tablet, 2 times daily, Oral  metoprolol succinate (TOPROL-XL) 24 hr split tablet 12.5 mg, Daily, Oral    Plan  - Monitor strict I&Os and daily weights.    - Place on telemetry  - Low sodium diet  - Place on fluid restriction of 1.5 L.   - Cardiology has been consulted  - The patient's volume status is improving but not at their baseline as indicated by edema and crackles on lung auscultation  - plan for ischemic work up 7/2          Chronic atrial fibrillation  Patient has persistent (7 days or more) atrial fibrillation. Patient is currently in atrial fibrillation. [id number] Score: The patient doesn't have any registry metric data available. The patients heart rate in the last 24 hours is as follows:  Pulse  Min: 60  Max: 115     Antiarrhythmics  metoprolol succinate (TOPROL-XL) 24 hr split tablet 12.5 mg, Daily, Oral    Anticoagulants       Plan  - Replete lytes with a goal of K>4, Mg >2  - Patient is not anticoagulated due to angiogram today -- likely start after cath  - Patient's afib is currently controlled  -       VTE Risk Mitigation (From admission, onward)           Ordered     IP VTE HIGH RISK PATIENT  Once         07/01/25 1259     Place sequential compression device  Until discontinued         07/01/25 1259                    Discharge Planning   YADIEL:      Code Status: Full Code   Medical Readiness for Discharge Date:   Discharge Plan A: Home with family                        Rod Whitaker MD  Department of Hospital Medicine   [facility] - Telemetry

## 2025-07-02 NOTE — ASSESSMENT & PLAN NOTE
Patient has Systolic (HFrEF) heart failure that is Acute. On presentation their CHF was decompensated. Evidence of decompensated CHF on presentation includes: edema, crackles on lung auscultation, orthopnea, and paroxysmal nocturnal dyspnea (PND). The etiology of their decompensation is likely unknown/salt intake. Most recent BNP and echo results are listed below.  Recent Labs     07/01/25  1102   *     Latest ECHO  Results for orders placed during the hospital encounter of 07/01/25    Echo    Interpretation Summary    Left Ventricle: The left ventricle is mildly dilated. There is moderate eccentric hypertrophy. There is moderately reduced systolic function with a visually estimated ejection fraction of 30 - 35%.    Right Ventricle: The right ventricle is normal in size measuring 3.3 cm. Systolic function is normal.    Left Atrium: The left atrium is severely dilated    Right Atrium: The right atrium is mildly dilated .    Aortic Valve: There is mild aortic regurgitation.    Mitral Valve: There is mild regurgitation.    Aorta: The aortic root is moderately dilated measuring 4.8 cm. The proximal ascending aorta is moderately dilated measuring 4.3 cm.    Pulmonary Artery: The estimated pulmonary artery systolic pressure is 29 mmHg.    IVC/SVC: Intermediate venous pressure at 8 mmHg.    Pericardium: There is a trivial posterior effusion.    Current Heart Failure Medications  furosemide injection 40 mg, 2 times daily, Intravenous  hydrALAZINE tablet 25 mg, Every 8 hours PRN, Oral  sacubitriL-valsartan 24-26 mg per tablet 1 tablet, 2 times daily, Oral  metoprolol succinate (TOPROL-XL) 24 hr split tablet 12.5 mg, Daily, Oral    Plan  - Monitor strict I&Os and daily weights.    - Place on telemetry  - Low sodium diet  - Place on fluid restriction of 1.5 L.   - Cardiology has been consulted  - The patient's volume status is improving but not at their baseline as indicated by edema and crackles on lung auscultation  -  plan for ischemic work up 7/2

## 2025-07-02 NOTE — NURSING
Pt returned from Cath lab, NAD noted. Gauze and tegaderm noted to R. Femoral. Dressing CDI, no redness, no hematoma noted. Vital signs and frequent checks in progress.

## 2025-07-02 NOTE — ASSESSMENT & PLAN NOTE
Patient's blood pressure range in the last 24 hours was: BP  Min: 123/96  Max: 185/86.The patient's inpatient anti-hypertensive regimen is listed below:  Current Antihypertensives  furosemide injection 40 mg, 2 times daily, Intravenous  amLODIPine tablet 5 mg, Daily, Oral  hydrALAZINE tablet 25 mg, Every 8 hours PRN, Oral  metoprolol succinate (TOPROL-XL) 24 hr split tablet 12.5 mg, Daily, Oral    Controlled

## 2025-07-02 NOTE — ASSESSMENT & PLAN NOTE
Presents with troponin of 0.056 in setting of HTN and pulmonary edema. Denies chest pain, EKG without ST elevation  Troponin trend  Telemetry  Echo  Continue aspirin  - Cardiology consulted

## 2025-07-02 NOTE — CONSULTS
CM received a cardiac rehab referral. Attending provider will place an ambulatory referral at discharge, if appropriate.

## 2025-07-02 NOTE — HOSPITAL COURSE
Mr. Baron is a 63 yo M admitted with new onset acute CHF exacerbation. Lasix was started. Echo ordered. TTE with HFrEF 30-35%, moderate LV eccentric hypertrophy. Aortic root moderately dilated at 4.8 cm and proximal ascending aorta moderately dilated at 4.3 cm. Cardiology consulted. Started on entresto and Toprol. He underwent angiogram with no obstructive CAD, CHF nonischemic. Also found to be in afib, underwent PEYTON/CV the following day and now in sinus rhythm. Added spironolactone. Tolerating meds well. He has follow up with [facility] next week. Meds delivered to bedside. All questions answered. Referal placed for sleep study. Discharged home in  stable condition.

## 2025-07-02 NOTE — CONSULTS
[facility] - Telemetry  Cardiology  Consult Note    Patient Name: Dru Baron  MRN: [id number]  Admission Date: 7/1/2025  Hospital Length of Stay: 1 days  Code Status: Full Code   Attending Provider: Rod Whitaker MD   Consulting Provider: Fortino Chung MD  Primary Care Physician: Eric Oconnor MD  Principal Problem:Pulmonary edema    Patient information was obtained from patient and ER records.     Inpatient consult to Cardiology  Consult performed by: Fortino Chung MD  Consult ordered by: Rod Whitaker MD        Subjective:     Chief Complaint:  CHF, A-fib            HPI: Dru Baron 64 y.o. male with HTN and arthritis presents to the hospital with a chief complaint of shortness breath.  He reports 5 days of shortness a breath with associated lower extremity edema nonproductive cough.  He finds his shortness breath worsens with lying flat as improved with IV Lasix in the emergency room.  He has never experienced symptoms such as this in the past.  He denies any history of heart failure.  He has been compliant with his home amlodipine.  He denies fever nausea vomiting abdominal pain melena hematuria hematemesis dizziness and syncope.     In the ED, afebrile without leukocytosis EKG without ST elevation chest x-ray with pulmonary edema troponin 0.056  COVID and flu negative.     SOB for the last week  Less SOB, denies CP  LE edema improving  EKG A-fib - new Dx NSSTT changes    Troponin 0.05 flat  Denies prior CHF    Echo 7/1/25    Left Ventricle: The left ventricle is mildly dilated. There is moderate eccentric hypertrophy. There is moderately reduced systolic function with a visually estimated ejection fraction of 30 - 35%.    Right Ventricle: The right ventricle is normal in size measuring 3.3 cm. Systolic function is normal.    Left Atrium: The left atrium is severely dilated    Right Atrium: The right atrium is mildly dilated .    Aortic Valve: There is mild aortic regurgitation.     Mitral Valve: There is mild regurgitation.    Aorta: The aortic root is moderately dilated measuring 4.8 cm. The proximal ascending aorta is moderately dilated measuring 4.3 cm.    Pulmonary Artery: The estimated pulmonary artery systolic pressure is 29 mmHg.    IVC/SVC: Intermediate venous pressure at 8 mmHg.    Pericardium: There is a trivial posterior effusion.       Past Medical History:   Diagnosis Date    Arthritis     GIB (gastrointestinal bleeding) 12/03/2021    Hypertension        Past Surgical History:   Procedure Laterality Date    ESOPHAGOGASTRODUODENOSCOPY N/A 12/6/2021    Procedure: EGD (ESOPHAGOGASTRODUODENOSCOPY);  Surgeon: Pritesh Snow MD;  Location: [facility] ENDO;  Service: Endoscopy;  Laterality: N/A;    ESOPHAGOGASTRODUODENOSCOPY N/A 3/21/2022    Procedure: ESOPHAGOGASTRODUODENOSCOPY (EGD);  Surgeon: Zeus Kwon MD;  Location: [facility] ENDO;  Service: Endoscopy;  Laterality: N/A;  Fully vaccinated (pt will bring card in day of procedure), instr mailed -ml    HERNIA REPAIR  1990'S    HIATAL       Review of patient's allergies indicates:  No Known Allergies    No current facility-administered medications on file prior to encounter.     Current Outpatient Medications on File Prior to Encounter   Medication Sig    amLODIPine (NORVASC) 5 MG tablet Take 1 tablet (5 mg total) by mouth once daily.    gabapentin (NEURONTIN) 600 MG tablet Take 600 mg by mouth 2 (two) times a day.    ammonium lactate 12 % Crea Apply twice daily to affected parts both feet as needed.    ammonium lactate 12 % Crea Apply 1 application topically 2 (two) times daily.    fluticasone propionate (FLONASE) 50 mcg/actuation nasal spray USE 2 SPRAYS IN EACH NOSTRIL ONCE DAILY    fluticasone propionate (FLONASE) 50 mcg/actuation nasal spray by Each Nostril route.    pantoprazole (PROTONIX) 40 MG tablet Take 1 tablet (40 mg total) by mouth 2 (two) times daily.     Family History    None       Tobacco Use    Smoking status: Never     Smokeless tobacco: Never   Substance and Sexual Activity    Alcohol use: Never    Drug use: Never    Sexual activity: Not on file     Review of Systems   Constitutional: Negative for decreased appetite.   HENT:  Negative for ear discharge.    Eyes:  Negative for blurred vision.   Endocrine: Negative for polyphagia.   Skin:  Negative for nail changes.   Genitourinary:  Negative for bladder incontinence.   Neurological:  Negative for aphonia.   Psychiatric/Behavioral:  Negative for hallucinations.    Allergic/Immunologic: Negative for hives.     Objective:     Vital Signs (Most Recent):  Temp: 98.2 °F (36.8 °C) (07/02/25 0757)  Pulse: 90 (07/02/25 0757)  Resp: 18 (07/02/25 0757)  BP: (!) 148/91 (07/02/25 0757)  SpO2: (!) 90 % (07/02/25 0757) Vital Signs (24h Range):  Temp:  [97.5 °F (36.4 °C)-98.7 °F (37.1 °C)] 98.2 °F (36.8 °C)  Pulse:  [] 90  Resp:  [16-24] 18  SpO2:  [90 %-96 %] 90 %  BP: (123-185)/() 148/91     Weight: (!) 151.5 kg (334 lb)  Body mass index is 42.88 kg/m².    SpO2: (!) 90 %         Intake/Output Summary (Last 24 hours) at 7/2/2025 0945  Last data filed at 7/2/2025 0429  Gross per 24 hour   Intake 120 ml   Output 1925 ml   Net -1805 ml       Lines/Drains/Airways       Peripheral Intravenous Line  Duration             Peripheral IV Single Lumen 07/01/25 1041 20 G Left Forearm <1 day                     Physical Exam  Constitutional:       Appearance: He is well-developed.   HENT:      Head: Normocephalic and atraumatic.   Eyes:      Conjunctiva/sclera: Conjunctivae normal.      Pupils: Pupils are equal, round, and reactive to light.   Cardiovascular:      Rate and Rhythm: Normal rate. Rhythm irregular.      Pulses: Intact distal pulses.      Heart sounds: Normal heart sounds.   Pulmonary:      Effort: Pulmonary effort is normal.      Breath sounds: Normal breath sounds.   Abdominal:      General: Bowel sounds are normal.      Palpations: Abdomen is soft.   Musculoskeletal:          General: Normal range of motion.      Cervical back: Normal range of motion and neck supple.      Right lower leg: Edema present.      Left lower leg: Edema present.   Skin:     General: Skin is warm and dry.   Neurological:      Mental Status: He is alert and oriented to person, place, and time.          Significant Labs: All pertinent lab results from the last 24 hours have been reviewed.    Significant Imaging: Echocardiogram: 2D echo with color flow doppler: No results found for this or any previous visit.  Assessment and Plan:     Chronic atrial fibrillation  New Dx. Duration unknown. Rate controlled. Possible trigger for CHF. Will start DOAC after R/LHC and discuss PEYTON/CV in AM if A-fib persists    Antiarrhythmics  metoprolol succinate (TOPROL-XL) 24 hr split tablet 12.5 mg, Daily, Oral    Anticoagulants       Plan  - Replete lytes with a goal of K>4, Mg >2      Acute systolic congestive heart failure  EF 30-35% on echo - new Dx. Continue GDMT as tolerated. R/LHC today - risks/benefits explained and he agrees to proceed    Recent Labs     07/01/25  1102   *     Latest ECHO  Results for orders placed during the hospital encounter of 07/01/25    Echo    Interpretation Summary    Left Ventricle: The left ventricle is mildly dilated. There is moderate eccentric hypertrophy. There is moderately reduced systolic function with a visually estimated ejection fraction of 30 - 35%.    Right Ventricle: The right ventricle is normal in size measuring 3.3 cm. Systolic function is normal.    Left Atrium: The left atrium is severely dilated    Right Atrium: The right atrium is mildly dilated .    Aortic Valve: There is mild aortic regurgitation.    Mitral Valve: There is mild regurgitation.    Aorta: The aortic root is moderately dilated measuring 4.8 cm. The proximal ascending aorta is moderately dilated measuring 4.3 cm.    Pulmonary Artery: The estimated pulmonary artery systolic pressure is 29 mmHg.    IVC/SVC:  Intermediate venous pressure at 8 mmHg.    Pericardium: There is a trivial posterior effusion.    Current Heart Failure Medications  furosemide injection 40 mg, 2 times daily, Intravenous  hydrALAZINE tablet 25 mg, Every 8 hours PRN, Oral  sacubitriL-valsartan 24-26 mg per tablet 1 tablet, 2 times daily, Oral  metoprolol succinate (TOPROL-XL) 24 hr split tablet 12.5 mg, Daily, Oral            Elevated troponin  Suspect demand ischemia from CHF - R/LHC today    Essential hypertension  Patient's blood pressure range in the last 24 hours was: BP  Min: 123/96  Max: 185/86.The patient's inpatient anti-hypertensive regimen is listed below:  Current Antihypertensives  furosemide injection 40 mg, 2 times daily, Intravenous  amLODIPine tablet 5 mg, Daily, Oral  hydrALAZINE tablet 25 mg, Every 8 hours PRN, Oral  metoprolol succinate (TOPROL-XL) 24 hr split tablet 12.5 mg, Daily, Oral    Controlled        VTE Risk Mitigation (From admission, onward)           Ordered     IP VTE HIGH RISK PATIENT  Once         07/01/25 1259     Place sequential compression device  Until discontinued         07/01/25 1259                    Thank you for your consult. I will follow-up with patient. Please contact us if you have any additional questions.    Fortino Chung MD  Cardiology   [facility] [place] [facility]

## 2025-07-02 NOTE — SUBJECTIVE & OBJECTIVE
Past Medical History:   Diagnosis Date    Arthritis     GIB (gastrointestinal bleeding) 12/03/2021    Hypertension        Past Surgical History:   Procedure Laterality Date    ESOPHAGOGASTRODUODENOSCOPY N/A 12/6/2021    Procedure: EGD (ESOPHAGOGASTRODUODENOSCOPY);  Surgeon: Pritesh Snow MD;  Location: [facility];  Service: Endoscopy;  Laterality: N/A;    ESOPHAGOGASTRODUODENOSCOPY N/A 3/21/2022    Procedure: ESOPHAGOGASTRODUODENOSCOPY (EGD);  Surgeon: Zeus Kwon MD;  Location: [facility];  Service: Endoscopy;  Laterality: N/A;  Fully vaccinated (pt will bring card in day of procedure), instr mailed -ml    HERNIA REPAIR  1990'S    HIATAL       Review of patient's allergies indicates:  No Known Allergies    No current facility-administered medications on file prior to encounter.     Current Outpatient Medications on File Prior to Encounter   Medication Sig    amLODIPine (NORVASC) 5 MG tablet Take 1 tablet (5 mg total) by mouth once daily.    gabapentin (NEURONTIN) 600 MG tablet Take 600 mg by mouth 2 (two) times a day.    ammonium lactate 12 % Crea Apply twice daily to affected parts both feet as needed.    ammonium lactate 12 % Crea Apply 1 application topically 2 (two) times daily.    fluticasone propionate (FLONASE) 50 mcg/actuation nasal spray USE 2 SPRAYS IN EACH NOSTRIL ONCE DAILY    fluticasone propionate (FLONASE) 50 mcg/actuation nasal spray by Each Nostril route.    pantoprazole (PROTONIX) 40 MG tablet Take 1 tablet (40 mg total) by mouth 2 (two) times daily.     Family History    None       Tobacco Use    Smoking status: Never    Smokeless tobacco: Never   Substance and Sexual Activity    Alcohol use: Never    Drug use: Never    Sexual activity: Not on file     Review of Systems   Constitutional: Negative for decreased appetite.   HENT:  Negative for ear discharge.    Eyes:  Negative for blurred vision.   Endocrine: Negative for polyphagia.   Skin:  Negative for nail changes.   Genitourinary:   Negative for bladder incontinence.   Neurological:  Negative for aphonia.   Psychiatric/Behavioral:  Negative for hallucinations.    Allergic/Immunologic: Negative for hives.     Objective:     Vital Signs (Most Recent):  Temp: 98.2 °F (36.8 °C) (07/02/25 0757)  Pulse: 90 (07/02/25 0757)  Resp: 18 (07/02/25 0757)  BP: (!) 148/91 (07/02/25 0757)  SpO2: (!) 90 % (07/02/25 0757) Vital Signs (24h Range):  Temp:  [97.5 °F (36.4 °C)-98.7 °F (37.1 °C)] 98.2 °F (36.8 °C)  Pulse:  [] 90  Resp:  [16-24] 18  SpO2:  [90 %-96 %] 90 %  BP: (123-185)/() 148/91     Weight: (!) 151.5 kg (334 lb)  Body mass index is 42.88 kg/m².    SpO2: (!) 90 %         Intake/Output Summary (Last 24 hours) at 7/2/2025 0945  Last data filed at 7/2/2025 0429  Gross per 24 hour   Intake 120 ml   Output 1925 ml   Net -1805 ml       Lines/Drains/Airways       Peripheral Intravenous Line  Duration             Peripheral IV Single Lumen 07/01/25 1041 20 G Left Forearm <1 day                     Physical Exam  Constitutional:       Appearance: He is well-developed.   HENT:      Head: Normocephalic and atraumatic.   Eyes:      Conjunctiva/sclera: Conjunctivae normal.      Pupils: Pupils are equal, round, and reactive to light.   Cardiovascular:      Rate and Rhythm: Normal rate. Rhythm irregular.      Pulses: Intact distal pulses.      Heart sounds: Normal heart sounds.   Pulmonary:      Effort: Pulmonary effort is normal.      Breath sounds: Normal breath sounds.   Abdominal:      General: Bowel sounds are normal.      Palpations: Abdomen is soft.   Musculoskeletal:         General: Normal range of motion.      Cervical back: Normal range of motion and neck supple.      Right lower leg: Edema present.      Left lower leg: Edema present.   Skin:     General: Skin is warm and dry.   Neurological:      Mental Status: He is alert and oriented to person, place, and time.          Significant Labs: All pertinent lab results from the last 24 hours  have been reviewed.    Significant Imaging: Echocardiogram: 2D echo with color flow doppler: No results found for this or any previous visit.

## 2025-07-02 NOTE — ASSESSMENT & PLAN NOTE
EF 30-35% on echo - new Dx. Continue GDMT as tolerated. R/LHC today - risks/benefits explained and he agrees to proceed    Recent Labs     07/01/25  1102   *     Latest ECHO  Results for orders placed during the hospital encounter of 07/01/25    Echo    Interpretation Summary    Left Ventricle: The left ventricle is mildly dilated. There is moderate eccentric hypertrophy. There is moderately reduced systolic function with a visually estimated ejection fraction of 30 - 35%.    Right Ventricle: The right ventricle is normal in size measuring 3.3 cm. Systolic function is normal.    Left Atrium: The left atrium is severely dilated    Right Atrium: The right atrium is mildly dilated .    Aortic Valve: There is mild aortic regurgitation.    Mitral Valve: There is mild regurgitation.    Aorta: The aortic root is moderately dilated measuring 4.8 cm. The proximal ascending aorta is moderately dilated measuring 4.3 cm.    Pulmonary Artery: The estimated pulmonary artery systolic pressure is 29 mmHg.    IVC/SVC: Intermediate venous pressure at 8 mmHg.    Pericardium: There is a trivial posterior effusion.    Current Heart Failure Medications  furosemide injection 40 mg, 2 times daily, Intravenous  hydrALAZINE tablet 25 mg, Every 8 hours PRN, Oral  sacubitriL-valsartan 24-26 mg per tablet 1 tablet, 2 times daily, Oral  metoprolol succinate (TOPROL-XL) 24 hr split tablet 12.5 mg, Daily, Oral

## 2025-07-02 NOTE — NURSING
[facility], [facility]  Nurses Note -- 4 Eyes      7/2/2025       Skin assessed on: Q Shift      [x] No Pressure Injuries Present    [x]Prevention Measures Documented    [] Yes LDA  for Pressure Injury Previously documented     [] Yes New Pressure Injury Discovered   [] LDA for New Pressure Injury Added      Attending RN:  Ajit Simons, RN     Second RN:  Taina SHAY LPN

## 2025-07-02 NOTE — ASSESSMENT & PLAN NOTE
New Dx. Duration unknown. Rate controlled. Possible trigger for CHF. Will start DOAC after R/LHC and discuss PEYTON/CV in AM if A-fib persists    Antiarrhythmics  metoprolol succinate (TOPROL-XL) 24 hr split tablet 12.5 mg, Daily, Oral    Anticoagulants       Plan  - Replete lytes with a goal of K>4, Mg >2

## 2025-07-02 NOTE — NURSING
[facility], [facility]  Nurses Note -- 4 Eyes      7/1/2025       Skin assessed on: Q Shift      [x] No Pressure Injuries Present    []Prevention Measures Documented    [] Yes LDA  for Pressure Injury Previously documented     [] Yes New Pressure Injury Discovered   [] LDA for New Pressure Injury Added      Attending RN:  Taina Parikh LPN     Second RN:  Ajit Simons RN

## 2025-07-02 NOTE — CONSULTS
Food & Nutrition  Education    Diet Education: For education on fluid and salt restriction   Time Spent: 15 minutes   Learners: Patient       Nutrition Education provided with handouts: Heart Failure Nutrition Therapy, Fluid Restricted Diet, Sodium Free Seasonings      Comments: Pt NPO at time of education but advanced to heart healthy diet for lunch. Denies N/V/D/C. Reports lower appetite, discussed how this could be related to fluid retention. Pt reports he already does not add salt to his dishes while cooking. Discussed a heart healthy low sodium diet, what a fluid restriction entails and importance of daily weight monitoring. Reviewed tips for managing thirst/dry mouth while on a fluid restriction. Discussed sodium free seasonings and ways to flavor food without salt. Pt expressed understanding of all topics discussed.       All questions and concerns answered. Dietitian's contact information provided.       Follow-Up: 1x/week      Please Re-consult as needed        Thanks!

## 2025-07-02 NOTE — PLAN OF CARE
07/02/25 0930   Rounds   Attendance Provider;   Discharge Plan A Home with family   Why the patient remains in the hospital Requires continued medical care   Transition of Care Barriers None

## 2025-07-02 NOTE — ASSESSMENT & PLAN NOTE
Patient has persistent (7 days or more) atrial fibrillation. Patient is currently in atrial fibrillation. [id number] Score: The patient doesn't have any registry metric data available. The patients heart rate in the last 24 hours is as follows:  Pulse  Min: 60  Max: 115     Antiarrhythmics  metoprolol succinate (TOPROL-XL) 24 hr split tablet 12.5 mg, Daily, Oral    Anticoagulants       Plan  - Replete lytes with a goal of K>4, Mg >2  - Patient is not anticoagulated due to angiogram today -- likely start after cath  - Patient's afib is currently controlled  -

## 2025-07-02 NOTE — ASSESSMENT & PLAN NOTE
Patient's blood pressure range in the last 24 hours was: BP  Min: 123/96  Max: 185/86.The patient's inpatient anti-hypertensive regimen is listed below:  Current Antihypertensives  furosemide injection 40 mg, 2 times daily, Intravenous  amLODIPine tablet 5 mg, Daily, Oral  hydrALAZINE tablet 25 mg, Every 8 hours PRN, Oral  metoprolol succinate (TOPROL-XL) 24 hr split tablet 12.5 mg, Daily, Oral    Plan  - BP is controlled, no changes needed to their regimen  - continue home amlodipine, start entresto/toprol

## 2025-07-02 NOTE — SUBJECTIVE & OBJECTIVE
Interval History: no acute issues, feeling better but still with shortness of breath lying down.     Review of Systems  Objective:     Vital Signs (Most Recent):  Temp: 98.2 °F (36.8 °C) (07/02/25 0757)  Pulse: 91 (07/02/25 1040)  Resp: 18 (07/02/25 0757)  BP: (!) 148/91 (07/02/25 0757)  SpO2: (!) 90 % (07/02/25 0757) Vital Signs (24h Range):  Temp:  [97.5 °F (36.4 °C)-98.7 °F (37.1 °C)] 98.2 °F (36.8 °C)  Pulse:  [] 91  Resp:  [18-20] 18  SpO2:  [90 %-95 %] 90 %  BP: (123-185)/() 148/91     Weight: (!) 151.5 kg (334 lb)  Body mass index is 42.88 kg/m².    Intake/Output Summary (Last 24 hours) at 7/2/2025 1111  Last data filed at 7/2/2025 1054  Gross per 24 hour   Intake 120 ml   Output 2125 ml   Net -2005 ml         Physical Exam  Vitals and nursing note reviewed.   Constitutional:       General: He is not in acute distress.     Appearance: He is well-developed.   HENT:      Head: Normocephalic and atraumatic.      Right Ear: External ear normal.      Left Ear: External ear normal.   Eyes:      General:         Right eye: No discharge.         Left eye: No discharge.      Conjunctiva/sclera: Conjunctivae normal.   Neck:      Thyroid: No thyromegaly.   Cardiovascular:      Rate and Rhythm: Normal rate and regular rhythm.      Heart sounds: No murmur heard.  Pulmonary:      Effort: Pulmonary effort is normal. No respiratory distress.      Comments: On RA  Abdominal:      General: Bowel sounds are normal. There is no distension.      Palpations: Abdomen is soft. There is no mass.      Tenderness: There is no abdominal tenderness.   Musculoskeletal:         General: No deformity.      Cervical back: Normal range of motion.      Right lower leg: Edema present.      Left lower leg: Edema present.   Skin:     General: Skin is warm and dry.   Neurological:      Mental Status: He is alert and oriented to person, place, and time.   Psychiatric:         Behavior: Behavior normal.               Significant Labs:  All pertinent labs within the past 24 hours have been reviewed.    Significant Imaging: I have reviewed all pertinent imaging results/findings within the past 24 hours.

## 2025-07-02 NOTE — CONSULTS
[facility] - Telemetry  Cardiology  Consult Note    Patient Name: Dru Baron  MRN: [id number]  Admission Date: 7/1/2025  Hospital Length of Stay: 1 days  Code Status: Full Code   Attending Provider: Rod Whitaker MD   Consulting Provider: Fortino Chung MD  Primary Care Physician: Eric Oconnor MD  Principal Problem:Pulmonary edema    Patient information was obtained from patient and ER records.     Inpatient consult to Cardiology  Consult performed by: Fortino Chung MD  Consult ordered by: Floyd La PA-C        Subjective:     Chief Complaint:  CHF, A-fib               HPI: Dru Baron 64 y.o. male with HTN and arthritis presents to the hospital with a chief complaint of shortness breath.  He reports 5 days of shortness a breath with associated lower extremity edema nonproductive cough.  He finds his shortness breath worsens with lying flat as improved with IV Lasix in the emergency room.  He has never experienced symptoms such as this in the past.  He denies any history of heart failure.  He has been compliant with his home amlodipine.  He denies fever nausea vomiting abdominal pain melena hematuria hematemesis dizziness and syncope.     In the ED, afebrile without leukocytosis EKG without ST elevation chest x-ray with pulmonary edema troponin 0.056  COVID and flu negative.     SOB for the last week  Less SOB, denies CP  LE edema improving  EKG A-fib - new Dx NSSTT changes    Troponin 0.05 flat  Denies prior CHF    Echo 7/1/25    Left Ventricle: The left ventricle is mildly dilated. There is moderate eccentric hypertrophy. There is moderately reduced systolic function with a visually estimated ejection fraction of 30 - 35%.    Right Ventricle: The right ventricle is normal in size measuring 3.3 cm. Systolic function is normal.    Left Atrium: The left atrium is severely dilated    Right Atrium: The right atrium is mildly dilated .    Aortic Valve: There is mild aortic  regurgitation.    Mitral Valve: There is mild regurgitation.    Aorta: The aortic root is moderately dilated measuring 4.8 cm. The proximal ascending aorta is moderately dilated measuring 4.3 cm.    Pulmonary Artery: The estimated pulmonary artery systolic pressure is 29 mmHg.    IVC/SVC: Intermediate venous pressure at 8 mmHg.    Pericardium: There is a trivial posterior effusion.       No new subjective & objective note has been filed under this hospital service since the last note was generated.    Assessment and Plan:     Chronic atrial fibrillation  New Dx. Duration unknown. Rate controlled. Possible trigger for CHF. Will start DOAC after R/LHC and discuss PEYTON/CV in AM if A-fib persists    Antiarrhythmics  metoprolol succinate (TOPROL-XL) 24 hr split tablet 12.5 mg, Daily, Oral    Anticoagulants       Plan  - Replete lytes with a goal of K>4, Mg >2      Acute systolic congestive heart failure  EF 30-35% on echo - new Dx. Continue GDMT as tolerated. R/LHC today - risks/benefits explained and he agrees to proceed    Recent Labs     07/01/25  1102   *     Latest ECHO  Results for orders placed during the hospital encounter of 07/01/25    Echo    Interpretation Summary    Left Ventricle: The left ventricle is mildly dilated. There is moderate eccentric hypertrophy. There is moderately reduced systolic function with a visually estimated ejection fraction of 30 - 35%.    Right Ventricle: The right ventricle is normal in size measuring 3.3 cm. Systolic function is normal.    Left Atrium: The left atrium is severely dilated    Right Atrium: The right atrium is mildly dilated .    Aortic Valve: There is mild aortic regurgitation.    Mitral Valve: There is mild regurgitation.    Aorta: The aortic root is moderately dilated measuring 4.8 cm. The proximal ascending aorta is moderately dilated measuring 4.3 cm.    Pulmonary Artery: The estimated pulmonary artery systolic pressure is 29 mmHg.    IVC/SVC: Intermediate  venous pressure at 8 mmHg.    Pericardium: There is a trivial posterior effusion.    Current Heart Failure Medications  furosemide injection 40 mg, 2 times daily, Intravenous  hydrALAZINE tablet 25 mg, Every 8 hours PRN, Oral  sacubitriL-valsartan 24-26 mg per tablet 1 tablet, 2 times daily, Oral  metoprolol succinate (TOPROL-XL) 24 hr split tablet 12.5 mg, Daily, Oral            Elevated troponin  Suspect demand ischemia from CHF - R/LHC today    Essential hypertension  Patient's blood pressure range in the last 24 hours was: BP  Min: 123/96  Max: 185/86.The patient's inpatient anti-hypertensive regimen is listed below:  Current Antihypertensives  furosemide injection 40 mg, 2 times daily, Intravenous  amLODIPine tablet 5 mg, Daily, Oral  hydrALAZINE tablet 25 mg, Every 8 hours PRN, Oral  metoprolol succinate (TOPROL-XL) 24 hr split tablet 12.5 mg, Daily, Oral    Controlled        VTE Risk Mitigation (From admission, onward)           Ordered     IP VTE HIGH RISK PATIENT  Once         07/01/25 1259     Place sequential compression device  Until discontinued         07/01/25 1259                    Thank you for your consult. I will follow-up with patient. Please contact us if you have any additional questions.    Fortino Chung MD  Cardiology   [facility]

## 2025-07-02 NOTE — HPI
HPI: Dru Baron 64 y.o. male with HTN and arthritis presents to the hospital with a chief complaint of shortness breath.  He reports 5 days of shortness a breath with associated lower extremity edema nonproductive cough.  He finds his shortness breath worsens with lying flat as improved with IV Lasix in the emergency room.  He has never experienced symptoms such as this in the past.  He denies any history of heart failure.  He has been compliant with his home amlodipine.  He denies fever nausea vomiting abdominal pain melena hematuria hematemesis dizziness and syncope.     In the ED, afebrile without leukocytosis EKG without ST elevation chest x-ray with pulmonary edema troponin 0.056  COVID and flu negative.     SOB for the last week  Less SOB, denies CP  LE edema improving  EKG A-fib - new Dx NSSTT changes    Troponin 0.05 flat  Denies prior CHF    Echo 7/1/25    Left Ventricle: The left ventricle is mildly dilated. There is moderate eccentric hypertrophy. There is moderately reduced systolic function with a visually estimated ejection fraction of 30 - 35%.    Right Ventricle: The right ventricle is normal in size measuring 3.3 cm. Systolic function is normal.    Left Atrium: The left atrium is severely dilated    Right Atrium: The right atrium is mildly dilated .    Aortic Valve: There is mild aortic regurgitation.    Mitral Valve: There is mild regurgitation.    Aorta: The aortic root is moderately dilated measuring 4.8 cm. The proximal ascending aorta is moderately dilated measuring 4.3 cm.    Pulmonary Artery: The estimated pulmonary artery systolic pressure is 29 mmHg.    IVC/SVC: Intermediate venous pressure at 8 mmHg.    Pericardium: There is a trivial posterior effusion.

## 2025-07-03 ENCOUNTER — ANESTHESIA (OUTPATIENT)
Dept: CARDIOLOGY | Facility: HOSPITAL | Age: 65
End: 2025-07-03
Payer: MEDICARE

## 2025-07-03 ENCOUNTER — ANESTHESIA EVENT (OUTPATIENT)
Dept: CARDIOLOGY | Facility: HOSPITAL | Age: 65
End: 2025-07-03
Payer: MEDICARE

## 2025-07-03 DIAGNOSIS — I50.9 CONGESTIVE HEART FAILURE, UNSPECIFIED HF CHRONICITY, UNSPECIFIED HEART FAILURE TYPE: Primary | ICD-10-CM

## 2025-07-03 LAB
ANION GAP (OHS): 11 MMOL/L (ref 8–16)
BSA FOR ECHO PROCEDURE: 2.94 M2
BUN SERPL-MCNC: 11 MG/DL (ref 8–23)
CALCIUM SERPL-MCNC: 8.9 MG/DL (ref 8.7–10.5)
CHLORIDE SERPL-SCNC: 102 MMOL/L (ref 95–110)
CO2 SERPL-SCNC: 26 MMOL/L (ref 23–29)
CREAT SERPL-MCNC: 0.9 MG/DL (ref 0.5–1.4)
GFR SERPLBLD CREATININE-BSD FMLA CKD-EPI: >60 ML/MIN/1.73/M2
GLUCOSE SERPL-MCNC: 103 MG/DL (ref 70–110)
OHS QRS DURATION: 104 MS
OHS QTC CALCULATION: 459 MS
POTASSIUM SERPL-SCNC: 3.3 MMOL/L (ref 3.5–5.1)
SODIUM SERPL-SCNC: 139 MMOL/L (ref 136–145)

## 2025-07-03 PROCEDURE — 93005 ELECTROCARDIOGRAM TRACING: CPT

## 2025-07-03 PROCEDURE — 93010 ELECTROCARDIOGRAM REPORT: CPT | Mod: ,,, | Performed by: INTERNAL MEDICINE

## 2025-07-03 PROCEDURE — 37000009 HC ANESTHESIA EA ADD 15 MINS: Performed by: INTERNAL MEDICINE

## 2025-07-03 PROCEDURE — 11000001 HC ACUTE MED/SURG PRIVATE ROOM

## 2025-07-03 PROCEDURE — 63600175 PHARM REV CODE 636 W HCPCS: Performed by: PHYSICIAN ASSISTANT

## 2025-07-03 PROCEDURE — 25000003 PHARM REV CODE 250: Performed by: STUDENT IN AN ORGANIZED HEALTH CARE EDUCATION/TRAINING PROGRAM

## 2025-07-03 PROCEDURE — 25000003 PHARM REV CODE 250: Performed by: INTERNAL MEDICINE

## 2025-07-03 PROCEDURE — 25000003 PHARM REV CODE 250: Performed by: PHYSICIAN ASSISTANT

## 2025-07-03 PROCEDURE — 25000003 PHARM REV CODE 250: Performed by: NURSE ANESTHETIST, CERTIFIED REGISTERED

## 2025-07-03 PROCEDURE — 36415 COLL VENOUS BLD VENIPUNCTURE: CPT | Performed by: PHYSICIAN ASSISTANT

## 2025-07-03 PROCEDURE — 5A2204Z RESTORATION OF CARDIAC RHYTHM, SINGLE: ICD-10-PCS | Performed by: INTERNAL MEDICINE

## 2025-07-03 PROCEDURE — 37000008 HC ANESTHESIA 1ST 15 MINUTES: Performed by: INTERNAL MEDICINE

## 2025-07-03 PROCEDURE — 63600175 PHARM REV CODE 636 W HCPCS: Performed by: NURSE ANESTHETIST, CERTIFIED REGISTERED

## 2025-07-03 PROCEDURE — 82310 ASSAY OF CALCIUM: CPT | Performed by: PHYSICIAN ASSISTANT

## 2025-07-03 RX ORDER — ETOMIDATE 2 MG/ML
INJECTION INTRAVENOUS
Status: DISCONTINUED | OUTPATIENT
Start: 2025-07-03 | End: 2025-07-03

## 2025-07-03 RX ORDER — SPIRONOLACTONE 25 MG/1
25 TABLET ORAL DAILY
Status: DISCONTINUED | OUTPATIENT
Start: 2025-07-03 | End: 2025-07-04

## 2025-07-03 RX ORDER — LIDOCAINE HYDROCHLORIDE 20 MG/ML
INJECTION INTRAVENOUS
Status: DISCONTINUED | OUTPATIENT
Start: 2025-07-03 | End: 2025-07-03

## 2025-07-03 RX ORDER — SODIUM CHLORIDE 0.9 % (FLUSH) 0.9 %
10 SYRINGE (ML) INJECTION
Status: DISCONTINUED | OUTPATIENT
Start: 2025-07-03 | End: 2025-07-04 | Stop reason: HOSPADM

## 2025-07-03 RX ORDER — ONDANSETRON HYDROCHLORIDE 2 MG/ML
INJECTION, SOLUTION INTRAVENOUS
Status: DISCONTINUED | OUTPATIENT
Start: 2025-07-03 | End: 2025-07-03

## 2025-07-03 RX ORDER — CETIRIZINE HYDROCHLORIDE 10 MG/1
10 TABLET ORAL DAILY
Status: DISCONTINUED | OUTPATIENT
Start: 2025-07-03 | End: 2025-07-04 | Stop reason: HOSPADM

## 2025-07-03 RX ORDER — PANTOPRAZOLE SODIUM 40 MG/1
40 TABLET, DELAYED RELEASE ORAL 2 TIMES DAILY
Qty: 30 TABLET | Refills: 2 | Status: SHIPPED | OUTPATIENT
Start: 2025-07-03 | End: 2025-08-02

## 2025-07-03 RX ORDER — METOPROLOL SUCCINATE 25 MG/1
25 TABLET, EXTENDED RELEASE ORAL DAILY
Status: DISCONTINUED | OUTPATIENT
Start: 2025-07-03 | End: 2025-07-04 | Stop reason: HOSPADM

## 2025-07-03 RX ORDER — FUROSEMIDE 40 MG/1
40 TABLET ORAL 2 TIMES DAILY
Status: DISCONTINUED | OUTPATIENT
Start: 2025-07-03 | End: 2025-07-04 | Stop reason: HOSPADM

## 2025-07-03 RX ORDER — SPIRONOLACTONE 25 MG/1
25 TABLET ORAL DAILY
Qty: 30 TABLET | Refills: 11 | Status: SHIPPED | OUTPATIENT
Start: 2025-07-04 | End: 2026-07-04

## 2025-07-03 RX ORDER — NAPROXEN SODIUM 220 MG/1
81 TABLET, FILM COATED ORAL DAILY
Qty: 30 TABLET | Refills: 3 | Status: SHIPPED | OUTPATIENT
Start: 2025-07-04 | End: 2026-07-04

## 2025-07-03 RX ORDER — METOPROLOL SUCCINATE 25 MG/1
25 TABLET, EXTENDED RELEASE ORAL DAILY
Qty: 90 TABLET | Refills: 3 | Status: SHIPPED | OUTPATIENT
Start: 2025-07-04 | End: 2026-07-04

## 2025-07-03 RX ORDER — FUROSEMIDE 40 MG/1
40 TABLET ORAL 2 TIMES DAILY
Qty: 60 TABLET | Refills: 11 | Status: SHIPPED | OUTPATIENT
Start: 2025-07-03 | End: 2025-07-09

## 2025-07-03 RX ORDER — FENTANYL CITRATE 50 UG/ML
INJECTION, SOLUTION INTRAMUSCULAR; INTRAVENOUS
Status: DISCONTINUED | OUTPATIENT
Start: 2025-07-03 | End: 2025-07-03

## 2025-07-03 RX ADMIN — SACUBITRIL AND VALSARTAN 1 TABLET: 24; 26 TABLET, FILM COATED ORAL at 09:07

## 2025-07-03 RX ADMIN — GABAPENTIN 600 MG: 300 CAPSULE ORAL at 09:07

## 2025-07-03 RX ADMIN — LIDOCAINE HYDROCHLORIDE 100 MG: 20 INJECTION, SOLUTION INTRAVENOUS at 09:07

## 2025-07-03 RX ADMIN — CETIRIZINE HYDROCHLORIDE 10 MG: 10 TABLET, FILM COATED ORAL at 12:07

## 2025-07-03 RX ADMIN — PANTOPRAZOLE SODIUM 40 MG: 40 TABLET, DELAYED RELEASE ORAL at 09:07

## 2025-07-03 RX ADMIN — ETOMIDATE 2 MG: 2 INJECTION, SOLUTION INTRAVENOUS at 09:07

## 2025-07-03 RX ADMIN — ETOMIDATE 10 MG: 2 INJECTION, SOLUTION INTRAVENOUS at 09:07

## 2025-07-03 RX ADMIN — SODIUM CHLORIDE: 0.9 INJECTION, SOLUTION INTRAVENOUS at 09:07

## 2025-07-03 RX ADMIN — POTASSIUM CHLORIDE 40 MEQ: 1500 TABLET, EXTENDED RELEASE ORAL at 10:07

## 2025-07-03 RX ADMIN — METOPROLOL SUCCINATE 25 MG: 25 TABLET, EXTENDED RELEASE ORAL at 10:07

## 2025-07-03 RX ADMIN — FENTANYL CITRATE 50 MCG: 0.05 INJECTION, SOLUTION INTRAMUSCULAR; INTRAVENOUS at 09:07

## 2025-07-03 RX ADMIN — AMLODIPINE BESYLATE 5 MG: 5 TABLET ORAL at 09:07

## 2025-07-03 RX ADMIN — APIXABAN 5 MG: 5 TABLET, FILM COATED ORAL at 09:07

## 2025-07-03 RX ADMIN — ONDANSETRON 4 MG: 2 INJECTION, SOLUTION INTRAMUSCULAR; INTRAVENOUS at 09:07

## 2025-07-03 RX ADMIN — SPIRONOLACTONE 25 MG: 25 TABLET ORAL at 10:07

## 2025-07-03 RX ADMIN — ASPIRIN 81 MG CHEWABLE TABLET 81 MG: 81 TABLET CHEWABLE at 09:07

## 2025-07-03 RX ADMIN — BENZONATATE 100 MG: 100 CAPSULE ORAL at 04:07

## 2025-07-03 RX ADMIN — FUROSEMIDE 40 MG: 10 INJECTION, SOLUTION INTRAVENOUS at 10:07

## 2025-07-03 NOTE — PROGRESS NOTES
[facility] Medicine  Progress Note    Patient Name: Dru Baron  MRN: [id number]  Patient Class: IP- Inpatient   Admission Date: 7/1/2025  Length of Stay: 2 days  Attending Physician: Rod Whitaker MD  Primary Care Provider: Eric Oconnor MD        Subjective     Principal Problem:Pulmonary edema        HPI:  Dru Baron 64 y.o. male with HTN and arthritis presents to the hospital with a chief complaint of shortness breath.  He reports 5 days of shortness a breath with associated lower extremity edema nonproductive cough.  He finds his shortness breath worsens with lying flat as improved with IV Lasix in the emergency room.  He has never experienced symptoms such as this in the past.  He denies any history of heart failure.  He has been compliant with his home amlodipine.  He denies fever nausea vomiting abdominal pain melena hematuria hematemesis dizziness and syncope.    In the ED, afebrile without leukocytosis EKG without ST elevation chest x-ray with pulmonary edema troponin 0.056  COVID and flu negative.    Overview/Hospital Course:  Mr. Baron is a 65 yo M admitted with new onset acute CHF exacerbation. Lasix was started. Echo ordered. TTE with HFrEF 30-35%, moderate LV eccentric hypertrophy. Aortic root moderately dilated at 4.8 cm and proximal ascending aorta moderately dilated at 4.3 cm. Cardiology consulted. Started on entresto and Toprol. Plan for ischemic work up.    Interval History: PEYTON/CV this morning, now in sinus rhythm. O2 noted to be 90% on room air and still feeling drowsy    Review of Systems  Objective:     Vital Signs (Most Recent):  Temp: 98.1 °F (36.7 °C) (07/03/25 1122)  Pulse: 68 (07/03/25 1122)  Resp: 18 (07/03/25 1122)  BP: (!) 143/89 (07/03/25 1122)  SpO2: (!) 92 % (07/03/25 1330) Vital Signs (24h Range):  Temp:  [97.4 °F (36.3 °C)-98.5 °F (36.9 °C)] 98.1 °F (36.7 °C)  Pulse:  [] 68  Resp:  [17-20] 18  SpO2:  [90 %-96 %] 92 %  BP:  (132-164)/() 143/89     Weight: (!) 165.6 kg (365 lb)  Body mass index is 46.86 kg/m².    Intake/Output Summary (Last 24 hours) at 7/3/2025 1415  Last data filed at 7/3/2025 1305  Gross per 24 hour   Intake 410 ml   Output 1500 ml   Net -1090 ml         Physical Exam  Vitals and nursing note reviewed.   Constitutional:       General: He is not in acute distress.     Appearance: He is well-developed.   HENT:      Head: Normocephalic and atraumatic.      Right Ear: External ear normal.      Left Ear: External ear normal.   Eyes:      General:         Right eye: No discharge.         Left eye: No discharge.      Conjunctiva/sclera: Conjunctivae normal.   Neck:      Thyroid: No thyromegaly.   Cardiovascular:      Rate and Rhythm: Normal rate and regular rhythm.      Heart sounds: No murmur heard.  Pulmonary:      Effort: Pulmonary effort is normal. No respiratory distress.      Comments: On RA  Abdominal:      General: Bowel sounds are normal. There is no distension.      Palpations: Abdomen is soft. There is no mass.      Tenderness: There is no abdominal tenderness.   Musculoskeletal:         General: No deformity.      Right lower leg: Edema present.      Left lower leg: Edema present.   Skin:     General: Skin is warm and dry.   Neurological:      Mental Status: He is alert and oriented to person, place, and time.   Psychiatric:         Behavior: Behavior normal.               Significant Labs: All pertinent labs within the past 24 hours have been reviewed.    Significant Imaging: I have reviewed all pertinent imaging results/findings within the past 24 hours.      Assessment & Plan  Pulmonary edema  Presents with SOB, leg edema, and BNP of 940 concerning for new onset CHF. Without hypoxia on RA.   Continue IV lasix  Echo  Troponin trend/telemetry  Daily weights/strict intake and output  Essential hypertension  Patient's blood pressure range in the last 24 hours was: BP  Min: 132/81  Max: 164/106.The patient's  inpatient anti-hypertensive regimen is listed below:  Current Antihypertensives  amLODIPine tablet 5 mg, Daily, Oral  hydrALAZINE tablet 25 mg, Every 8 hours PRN, Oral  metoprolol succinate (TOPROL-XL) 24 hr tablet 25 mg, Daily, Oral  spironolactone tablet 25 mg, Daily, Oral  furosemide tablet 40 mg, 2 times daily, Oral    Plan  - BP is controlled, no changes needed to their regimen  - continue home amlodipine, start entresto/toprol  Elevated troponin  Presents with troponin of 0.056 in setting of HTN and pulmonary edema. Denies chest pain, EKG without ST elevation  Troponin trend  Telemetry  Echo  Continue aspirin  - Cardiology consulted  - [facility] 7/2 with no obstructive CAD  Acute systolic congestive heart failure  Patient has Systolic (HFrEF) heart failure that is Acute. On presentation their CHF was decompensated. Evidence of decompensated CHF on presentation includes: edema, crackles on lung auscultation, orthopnea, and paroxysmal nocturnal dyspnea (PND). The etiology of their decompensation is likely unknown/salt intake. Most recent BNP and echo results are listed below.  Recent Labs     07/01/25  1102   *     Latest ECHO  Results for orders placed during the hospital encounter of 07/01/25    Echo    Interpretation Summary    Left Ventricle: The left ventricle is mildly dilated. There is moderate eccentric hypertrophy. There is moderately reduced systolic function with a visually estimated ejection fraction of 30 - 35%.    Right Ventricle: The right ventricle is normal in size measuring 3.3 cm. Systolic function is normal.    Left Atrium: The left atrium is severely dilated    Right Atrium: The right atrium is mildly dilated .    Aortic Valve: There is mild aortic regurgitation.    Mitral Valve: There is mild regurgitation.    Aorta: The aortic root is moderately dilated measuring 4.8 cm. The proximal ascending aorta is moderately dilated measuring 4.3 cm.    Pulmonary Artery: The estimated pulmonary  artery systolic pressure is 29 mmHg.    IVC/SVC: Intermediate venous pressure at 8 mmHg.    Pericardium: There is a trivial posterior effusion.    Current Heart Failure Medications  hydrALAZINE tablet 25 mg, Every 8 hours PRN, Oral  sacubitriL-valsartan 24-26 mg per tablet 1 tablet, 2 times daily, Oral  metoprolol succinate (TOPROL-XL) 24 hr tablet 25 mg, Daily, Oral  spironolactone tablet 25 mg, Daily, Oral  furosemide tablet 40 mg, 2 times daily, Oral    Plan  - Monitor strict I&Os and daily weights.    - Place on telemetry  - Low sodium diet  - Place on fluid restriction of 1.5 L.   - Cardiology has been consulted  - The patient's volume status is improving but not at their baseline as indicated by edema and crackles on lung auscultation  - plan for ischemic work up 7/2 - non ischemic CM  - transition to PO lasix  - add spironolactone          Chronic atrial fibrillation  Patient has persistent (7 days or more) atrial fibrillation. Patient is currently in atrial fibrillation. [id number] Score: The patient doesn't have any registry metric data available. The patients heart rate in the last 24 hours is as follows:  Pulse  Min: 61  Max: 104     Antiarrhythmics  metoprolol succinate (TOPROL-XL) 24 hr tablet 25 mg, Daily, Oral    Anticoagulants  apixaban tablet 5 mg, 2 times daily, Oral    Plan  - Replete lytes with a goal of K>4, Mg >2  - Patient is anticoagulated, see medications listed above.  - Patient's afib is currently controlled  - s/p PEYTON/CV 7/3      VTE Risk Mitigation (From admission, onward)           Ordered     apixaban tablet 5 mg  2 times daily         07/02/25 1225     IP VTE HIGH RISK PATIENT  Once         07/01/25 1259     Place sequential compression device  Until discontinued         07/01/25 1259                    Discharge Planning   YADIEL: 7/3/2025     Code Status: Full Code   Medical Readiness for Discharge Date:   Discharge Plan A: Home with family                        Rod Whitaker,  MD  Department of Hospital Medicine   [facility] [place] - Telemetry

## 2025-07-03 NOTE — PROGRESS NOTES
"[facility]) nurse navigator notified of HFT[facility] candidate in need of education and introduction to 4-6 week program.      PT aao x 3 while lying in bed supine with spouse at bedside. Introduced self to pt as HFT[facility] nurse navigator.     Patient given "[facility] Home Care Guide" which includes "Daily weight and symptom tracker".  Encouraged pt and caregiver to review information.      Reviewed the following key points of HFTCC program with pt and family:   1.) Take your medications as directed.    2.) Weight yourself daily   3.) Follow low salt and limited fluid diet.    4.) Stop smoking and start exercising   5.) Go to your appointments and call your team.      Pt reminded to follow Symptom tracker and to call at the onset of symptoms according to tracker.     Reviewed plan for follow up once discharged to include phone calls, in person and virtual visits to assist pt optimizing their heart failure medication regimen and encouraging healthy lifestyle modifications.  Reminded pt that program will assist them over the next 4-6 weeks and then patient will be transferred to long term care provider .  Reminded pt how to contact HFTCC navigator via phone and or via [employer].     Pt instructed appointment will be printed on hospital discharge paperwork.     Pt also reminded RN will call 48-72 hours after discharge to check on them.     PT and family verbalize read back of some of the information given.  Encouraged pt and family to read over information often and contact team with any questions or concerns.      "

## 2025-07-03 NOTE — CARE UPDATE
R/LHC showed NICM  Still with A-fib/A-flutter  - on eliquis  Discussed PEYTON/CV today - risks/benefits explained and he agrees to proceed -  consent on chart. Ok for d/c later today if stable

## 2025-07-03 NOTE — NURSING
[facility]  Nurses Note -- 4 Eyes  Report received, pt awake, sitting up in bed on RA wife spouse at bedside, NAD noted at this time. Will cont to monitor     7/3/2025       Skin assessed on: Q Shift      [x] No Pressure Injuries Present    []Prevention Measures Documented    [] Yes LDA  for Pressure Injury Previously documented     [] Yes New Pressure Injury Discovered   [] LDA for New Pressure Injury Added      Attending RN:  So Mart LPN     Second RN:  ANI Ramachandran

## 2025-07-03 NOTE — NURSING
Testing for Home O2     O2 Sats on RA at rest = 94%    O2 sats on RA with exercise  = 90%    O2 sats on RA recovery = 92%

## 2025-07-03 NOTE — ANESTHESIA PREPROCEDURE EVALUATION
[facility]  Anesthesia Pre-Operative Evaluation         Patient Name: Dru Baron  YOB: 1960  MRN: [id number]    SUBJECTIVE:     Pre-operative evaluation for Procedure(s) (LRB):  Transesophageal echo (PEYTON) intra-procedure log documentation (N/A)  Transesophageal echo (PEYTON) intra-procedure log documentation (N/A)     07/03/2025    Dru Baron is a 64 y.o. male w/ a significant PMHx of HTN, GERD, CHF (EF 30-35%), atrial fibrillation.    Patient now presents for the above procedure(s).      LDA: None documented.    Prev airway: None documented.    Drips: None documented.    Problem List[1]    Review of patient's allergies indicates:  No Known Allergies    Current Outpatient Medications:  Current Medications[2]    Past Surgical History:   Procedure Laterality Date    CATHETERIZATION OF BOTH LEFT AND RIGHT HEART N/A 7/2/2025    Procedure: CATHETERIZATION, HEART, BOTH LEFT AND RIGHT;  Surgeon: Fortino Chung MD;  Location: [facility] CATH LAB;  Service: Cardiology;  Laterality: N/A;    ESOPHAGOGASTRODUODENOSCOPY N/A 12/6/2021    Procedure: EGD (ESOPHAGOGASTRODUODENOSCOPY);  Surgeon: Pritesh Snow MD;  Location: [facility] ENDO;  Service: Endoscopy;  Laterality: N/A;    ESOPHAGOGASTRODUODENOSCOPY N/A 3/21/2022    Procedure: ESOPHAGOGASTRODUODENOSCOPY (EGD);  Surgeon: Zeus Kwon MD;  Location: [facility] ENDO;  Service: Endoscopy;  Laterality: N/A;  Fully vaccinated (pt will bring card in day of procedure), instr mailed -ml    HERNIA REPAIR  1990'S    HIATAL       Social History[3]    OBJECTIVE:     Vital Signs Range (Last 24H):  Temp:  [36.3 °C (97.4 °F)-36.9 °C (98.5 °F)]   Pulse:  []   Resp:  [16-25]   BP: (131-164)/()   SpO2:  [92 %-95 %]       Significant Labs:  Lab Results   Component Value Date    WBC 8.55 07/01/2025    HGB 14.3 07/01/2025    HCT 46.1 07/01/2025     07/01/2025    CHOL 146 07/02/2025    TRIG 63 07/02/2025    HDL 46 07/02/2025    ALT 8 (L) 07/01/2025    AST  20 07/01/2025     07/03/2025    K 3.3 (L) 07/03/2025     07/03/2025    CREATININE 0.9 07/03/2025    BUN 11 07/03/2025    CO2 26 07/03/2025    TSH 2.205 07/02/2025    INR 1.0 12/03/2021    HGBA1C 4.8 07/01/2025       Diagnostic Studies: No relevant studies.    EKG:   Results for orders placed or performed during the hospital encounter of 07/01/25   EKG 12-lead    Collection Time: 07/01/25 12:00 AM    Narrative    Ordered by an unspecified provider.       2D ECHO:  TTE:  Results for orders placed or performed during the hospital encounter of 07/01/25   Echo   Result Value Ref Range    BSA 2.67 m2    LVOT stroke volume 75.1 cm3    LVIDd 5.8 3.5 - 6.0 cm    LV Systolic Volume 133 mL    LV Systolic Volume Index 51.6 mL/m2    LVIDs 5.3 (A) 2.1 - 4.0 cm    LV Diastolic Volume 164 mL    LV Diastolic Volume Index 63.57 mL/m2    Left Ventricular End Systolic Volume by Teichholz Method 132.59 mL    Left Ventricular End Diastolic Volume by Teichholz Method 163.68 mL    IVS 1.8 (A) 0.6 - 1.1 cm    LVOT diameter 2.4 cm    LVOT area 4.5 cm2    FS 8.6 (A) 28 - 44 %    Left Ventricle Relative Wall Thickness 0.55 cm    PW 1.6 (A) 0.6 - 1.1 cm    LV mass 486.1 g    LV Mass Index 188.4 g/m2    TR Max Hasmukh 2.3 m/s    LVOT peak hasmukh 0.9 m/s    Left Ventricular Outflow Tract Mean Velocity 0.64 cm/s    Left Ventricular Outflow Tract Mean Gradient 1.87 mmHg    RV- ibarra basal diam 3.3 cm    RV S' 12.06 cm/s    TAPSE 1.2 cm    RV/LV Ratio 0.57 cm    LA size 4.9 cm    Left Atrium Minor Axis 7.4 cm    Left Atrium Major Axis 6.6 cm    RA Major Axis 6.56 cm    AV mean gradient 5 mmHg    AV peak gradient 9 mmHg    Ao peak hasmukh 1.5 m/s    Ao VTI 22.9 cm    LVOT peak VTI 16.6 cm    AV valve area 3.3 cm²    AV Velocity Ratio 0.60     AV index (prosthetic) 0.72     VINNY by Velocity Ratio 2.7 cm²    MV mean gradient 3 mmHg    MV peak gradient 6 mmHg    MV valve area by continuity eq 2.69 cm2    MV VTI 27.9 cm    Triscuspid Valve Regurgitation  Peak Gradient 21 mmHg    PV PEAK VELOCITY 1.19 m/s    PV peak gradient 6 mmHg    Sinus 4.8 cm    ASI 1.9 cm/m2    STJ 4.4 cm    Ascending aorta 4.3 cm    ASI 1.7 cm/m2    IVC diameter 2.91 cm    ZLVIDS -5.20     ZLVIDD -10.98     RVDD 3.30 cm    ARCHIE 71 mL/m2    LA Vol 183 cm3    LA WIDTH 6.3 cm    RA Width 4.0 cm    TV resting pulmonary artery pressure 29 mmHg    RV TB RVSP 10 mmHg    Est. RA pres 8 mmHg    Narrative      Left Ventricle: The left ventricle is mildly dilated. There is moderate   eccentric hypertrophy. There is moderately reduced systolic function with   a visually estimated ejection fraction of 30 - 35%.    Right Ventricle: The right ventricle is normal in size measuring 3.3   cm. Systolic function is normal.    Left Atrium: The left atrium is severely dilated    Right Atrium: The right atrium is mildly dilated .    Aortic Valve: There is mild aortic regurgitation.    Mitral Valve: There is mild regurgitation.    Aorta: The aortic root is moderately dilated measuring 4.8 cm. The   proximal ascending aorta is moderately dilated measuring 4.3 cm.    Pulmonary Artery: The estimated pulmonary artery systolic pressure is   29 mmHg.    IVC/SVC: Intermediate venous pressure at 8 mmHg.    Pericardium: There is a trivial posterior effusion.         PEYTON:  No results found for this or any previous visit.    ASSESSMENT/PLAN:           Pre-op Assessment    I have reviewed the Patient Summary Reports.     I have reviewed the Nursing Notes. I have reviewed the NPO Status.   I have reviewed the Medications.     Review of Systems  Anesthesia Hx:  No problems with previous Anesthesia                Social:  Non-Smoker, Social Alcohol Use       Cardiovascular:     Hypertension    Dysrhythmias atrial fibrillation  CHF                                   Pulmonary:      Shortness of breath                  Renal/:  Renal/ Normal                 Hepatic/GI:     GERD                Neurological:  Neurology Normal                                       Endocrine:  Endocrine Normal                Physical Exam  General: Well nourished, Cooperative, Alert and Oriented    Airway:  Mallampati: II   Mouth Opening: Normal  TM Distance: Normal  Tongue: Normal  Neck ROM: Normal ROM    Dental:  Intact    Chest/Lungs:  Normal Respiratory Rate    Heart:  Rate: Normal  Rhythm: Regular Rhythm        Anesthesia Plan  Type of Anesthesia, risks & benefits discussed:    Anesthesia Type: Gen Natural Airway, Gen ETT  Intra-op Monitoring Plan: Standard ASA Monitors  Post Op Pain Control Plan: multimodal analgesia  Induction:  IV  Informed Consent: Informed consent signed with the Patient and all parties understand the risks and agree with anesthesia plan.  All questions answered. Patient consented to blood products? No  ASA Score: 3    Ready For Surgery From Anesthesia Perspective.     .           [1]   Patient Active Problem List  Diagnosis    Chronic pain of both knees    Decreased range of motion of both knees    Weakness of both lower extremities    Acute blood loss anemia    Thrombocytopenia    Essential hypertension    GERD (gastroesophageal reflux disease)    Pulmonary edema    Elevated troponin    Acute systolic congestive heart failure    Chronic atrial fibrillation   [2]   Current Facility-Administered Medications:     acetaminophen tablet 650 mg, 650 mg, Oral, Q6H PRN, Floyd La PA-C    amLODIPine tablet 5 mg, 5 mg, Oral, Daily, Floyd La PA-C, 5 mg at 07/03/25 0913    apixaban tablet 5 mg, 5 mg, Oral, BID, Fortino Chung MD, 5 mg at 07/03/25 0912    aspirin chewable tablet 81 mg, 81 mg, Oral, Daily, Floyd La PA-C, 81 mg at 07/03/25 0912    benzonatate capsule 100 mg, 100 mg, Oral, TID PRN, Floyd La PA-C    calcium carbonate 200 mg calcium (500 mg) chewable tablet 500 mg, 500 mg, Oral, Daily PRN, Floyd La PA-C    furosemide injection 40 mg, 40 mg, Intravenous, BID, Floyd La PA-C, 40 mg at  07/02/25 1748    gabapentin capsule 600 mg, 600 mg, Oral, BID, Floyd La PA-C, 600 mg at 07/03/25 0912    hydrALAZINE tablet 25 mg, 25 mg, Oral, Q8H PRN, Floyd La PA-C    HYDROcodone-acetaminophen 5-325 mg per tablet 1 tablet, 1 tablet, Oral, Q4H PRN, Fortino Chung MD, 1 tablet at 07/02/25 1622    melatonin tablet 6 mg, 6 mg, Oral, Nightly PRN, Floyd La PA-C, 6 mg at 07/01/25 2053    metoprolol succinate (TOPROL-XL) 24 hr tablet 25 mg, 25 mg, Oral, Daily, Rod Whitaker MD    ondansetron disintegrating tablet 4 mg, 4 mg, Oral, Q6H PRN, Floyd La PA-C    pantoprazole EC tablet 40 mg, 40 mg, Oral, Daily, Rod Whitaker MD, 40 mg at 07/03/25 0912    potassium chloride SA CR tablet 40 mEq, 40 mEq, Oral, Daily, Rod Whitaker MD, 40 mEq at 07/02/25 0957    sacubitriL-valsartan 24-26 mg per tablet 1 tablet, 1 tablet, Oral, BID, Rod Whitaker MD, 1 tablet at 07/03/25 0912    senna-docusate 8.6-50 mg per tablet 1 tablet, 1 tablet, Oral, Daily PRN, Floyd La PA-C    sodium chloride 0.9% flush 10 mL, 10 mL, Intravenous, PRN, Floyd La PA-C    sodium chloride 0.9% flush 10 mL, 10 mL, Intravenous, PRN, Fortino Chung MD    sodium chloride 0.9% flush 10 mL, 10 mL, Intravenous, PRN, Fortino Chung MD    spironolactone tablet 25 mg, 25 mg, Oral, Daily, Rod Whitaker MD    Facility-Administered Medications Ordered in Other Encounters:     fentaNYL injection, , Intravenous, PRN, Malcolm Vásquez CRNA, 50 mcg at 07/03/25 0938    ondansetron injection, , Intravenous, PRN, Malcolm Vásquez CRNA, 4 mg at 07/03/25 0938    sodium chloride 0.9% infusion, , Intravenous, Continuous PRN, Malcolm Vásquez CRNA, New Bag at 07/03/25 0932  [3]   Social History  Socioeconomic History    Marital status:    Tobacco Use    Smoking status: Never    Smokeless tobacco: Never   Substance and Sexual Activity    Alcohol use: Never    Drug use: Never   Social History Narrative    **  Merged History Encounter **          Social Drivers of Health     Financial Resource Strain: Low Risk  (7/1/2025)    Overall Financial Resource Strain (CARDIA)     Difficulty of Paying Living Expenses: Not very hard   Food Insecurity: No Food Insecurity (7/1/2025)    Hunger Vital Sign     Worried About Running Out of Food in the Last Year: Never true     Ran Out of Food in the Last Year: Never true   Transportation Needs: No Transportation Needs (7/1/2025)    PRAPARE - Transportation     Lack of Transportation (Medical): No     Lack of Transportation (Non-Medical): No   Stress: No Stress Concern Present (7/1/2025)    [employer] - Occupational Stress Questionnaire     Feeling of Stress : Not at all   Housing Stability: Low Risk  (7/1/2025)    Housing Stability Vital Sign     Unable to Pay for Housing in the Last Year: No     Homeless in the Last Year: No

## 2025-07-03 NOTE — ASSESSMENT & PLAN NOTE
Patient has persistent (7 days or more) atrial fibrillation. Patient is currently in atrial fibrillation. [id number] Score: The patient doesn't have any registry metric data available. The patients heart rate in the last 24 hours is as follows:  Pulse  Min: 61  Max: 104     Antiarrhythmics  metoprolol succinate (TOPROL-XL) 24 hr tablet 25 mg, Daily, Oral    Anticoagulants  apixaban tablet 5 mg, 2 times daily, Oral    Plan  - Replete lytes with a goal of K>4, Mg >2  - Patient is anticoagulated, see medications listed above.  - Patient's afib is currently controlled  - s/p PEYTON/CV 7/3

## 2025-07-03 NOTE — TRANSFER OF CARE
"Anesthesia Transfer of Care Note    Patient: Dru Baron    Procedure(s) Performed: Procedure(s) (LRB):  Transesophageal echo (PEYTON) intra-procedure log documentation (N/A)  Transesophageal echo (PEYTON) intra-procedure log documentation (N/A)    Patient location: Other: OR 5    Anesthesia Type: general    Post pain: adequate analgesia    Post assessment: no apparent anesthetic complications and tolerated procedure well    Post vital signs: stable    Level of consciousness: awake    Nausea/Vomiting: no nausea/vomiting    Complications: none    Transfer of care protocol was followed    Last vitals: Visit Vitals  BP (!) 149/92 (BP Location: Right arm)   Pulse 61   Temp 36.5 °C (97.7 °F) (Skin)   Resp 17   Ht 6' 2" (1.88 m)   Wt (!) 165.8 kg (365 lb 8.4 oz)   SpO2 96%   BMI 46.93 kg/m²     "

## 2025-07-03 NOTE — NURSING
Pharmacy called to see about prescriptions being delivered to bedside due to them being closed tomorrow, informed that they were working on filing prescriptions now. Will cont to monitor

## 2025-07-03 NOTE — PLAN OF CARE
Problem: Fall Injury Risk  Goal: Absence of Fall and Fall-Related Injury  Outcome: Met  Intervention: Promote Injury-Free Environment  Flowsheets (Taken 7/3/2025 1706)  Safety Promotion/Fall Prevention:   assistive device/personal item within reach   family to remain at bedside   instructed to call staff for mobility   nonskid shoes/socks when out of bed   side rails raised x 2   room near unit station

## 2025-07-03 NOTE — NURSING
Pt arrived to the unit via stretcher on RA, slightly drowsy but AAOx4, wife at bedside. Will cont to monitor

## 2025-07-03 NOTE — ASSESSMENT & PLAN NOTE
Patient's blood pressure range in the last 24 hours was: BP  Min: 132/81  Max: 164/106.The patient's inpatient anti-hypertensive regimen is listed below:  Current Antihypertensives  amLODIPine tablet 5 mg, Daily, Oral  hydrALAZINE tablet 25 mg, Every 8 hours PRN, Oral  metoprolol succinate (TOPROL-XL) 24 hr tablet 25 mg, Daily, Oral  spironolactone tablet 25 mg, Daily, Oral  furosemide tablet 40 mg, 2 times daily, Oral    Plan  - BP is controlled, no changes needed to their regimen  - continue home amlodipine, start entresto/toprol

## 2025-07-03 NOTE — ASSESSMENT & PLAN NOTE
Presents with troponin of 0.056 in setting of HTN and pulmonary edema. Denies chest pain, EKG without ST elevation  Troponin trend  Telemetry  Echo  Continue aspirin  - Cardiology consulted  - [facility] 7/2 with no obstructive CAD

## 2025-07-03 NOTE — SUBJECTIVE & OBJECTIVE
Interval History: PEYTON/CV this morning, now in sinus rhythm. O2 noted to be 90% on room air and still feeling drowsy    Review of Systems  Objective:     Vital Signs (Most Recent):  Temp: 98.1 °F (36.7 °C) (07/03/25 1122)  Pulse: 68 (07/03/25 1122)  Resp: 18 (07/03/25 1122)  BP: (!) 143/89 (07/03/25 1122)  SpO2: (!) 92 % (07/03/25 1330) Vital Signs (24h Range):  Temp:  [97.4 °F (36.3 °C)-98.5 °F (36.9 °C)] 98.1 °F (36.7 °C)  Pulse:  [] 68  Resp:  [17-20] 18  SpO2:  [90 %-96 %] 92 %  BP: (132-164)/() 143/89     Weight: (!) 165.6 kg (365 lb)  Body mass index is 46.86 kg/m².    Intake/Output Summary (Last 24 hours) at 7/3/2025 1415  Last data filed at 7/3/2025 1305  Gross per 24 hour   Intake 410 ml   Output 1500 ml   Net -1090 ml         Physical Exam  Vitals and nursing note reviewed.   Constitutional:       General: He is not in acute distress.     Appearance: He is well-developed.   HENT:      Head: Normocephalic and atraumatic.      Right Ear: External ear normal.      Left Ear: External ear normal.   Eyes:      General:         Right eye: No discharge.         Left eye: No discharge.      Conjunctiva/sclera: Conjunctivae normal.   Neck:      Thyroid: No thyromegaly.   Cardiovascular:      Rate and Rhythm: Normal rate and regular rhythm.      Heart sounds: No murmur heard.  Pulmonary:      Effort: Pulmonary effort is normal. No respiratory distress.      Comments: On RA  Abdominal:      General: Bowel sounds are normal. There is no distension.      Palpations: Abdomen is soft. There is no mass.      Tenderness: There is no abdominal tenderness.   Musculoskeletal:         General: No deformity.      Right lower leg: Edema present.      Left lower leg: Edema present.   Skin:     General: Skin is warm and dry.   Neurological:      Mental Status: He is alert and oriented to person, place, and time.   Psychiatric:         Behavior: Behavior normal.               Significant Labs: All pertinent labs within  the past 24 hours have been reviewed.    Significant Imaging: I have reviewed all pertinent imaging results/findings within the past 24 hours.

## 2025-07-03 NOTE — ASSESSMENT & PLAN NOTE
Patient has Systolic (HFrEF) heart failure that is Acute. On presentation their CHF was decompensated. Evidence of decompensated CHF on presentation includes: edema, crackles on lung auscultation, orthopnea, and paroxysmal nocturnal dyspnea (PND). The etiology of their decompensation is likely unknown/salt intake. Most recent BNP and echo results are listed below.  Recent Labs     07/01/25  1102   *     Latest ECHO  Results for orders placed during the hospital encounter of 07/01/25    Echo    Interpretation Summary    Left Ventricle: The left ventricle is mildly dilated. There is moderate eccentric hypertrophy. There is moderately reduced systolic function with a visually estimated ejection fraction of 30 - 35%.    Right Ventricle: The right ventricle is normal in size measuring 3.3 cm. Systolic function is normal.    Left Atrium: The left atrium is severely dilated    Right Atrium: The right atrium is mildly dilated .    Aortic Valve: There is mild aortic regurgitation.    Mitral Valve: There is mild regurgitation.    Aorta: The aortic root is moderately dilated measuring 4.8 cm. The proximal ascending aorta is moderately dilated measuring 4.3 cm.    Pulmonary Artery: The estimated pulmonary artery systolic pressure is 29 mmHg.    IVC/SVC: Intermediate venous pressure at 8 mmHg.    Pericardium: There is a trivial posterior effusion.    Current Heart Failure Medications  hydrALAZINE tablet 25 mg, Every 8 hours PRN, Oral  sacubitriL-valsartan 24-26 mg per tablet 1 tablet, 2 times daily, Oral  metoprolol succinate (TOPROL-XL) 24 hr tablet 25 mg, Daily, Oral  spironolactone tablet 25 mg, Daily, Oral  furosemide tablet 40 mg, 2 times daily, Oral    Plan  - Monitor strict I&Os and daily weights.    - Place on telemetry  - Low sodium diet  - Place on fluid restriction of 1.5 L.   - Cardiology has been consulted  - The patient's volume status is improving but not at their baseline as indicated by edema and crackles  on lung auscultation  - plan for ischemic work up 7/2 - non ischemic CM  - transition to PO lasix  - add spironolactone

## 2025-07-04 VITALS
SYSTOLIC BLOOD PRESSURE: 128 MMHG | TEMPERATURE: 98 F | RESPIRATION RATE: 18 BRPM | HEART RATE: 67 BPM | OXYGEN SATURATION: 95 % | HEIGHT: 74 IN | DIASTOLIC BLOOD PRESSURE: 77 MMHG | BODY MASS INDEX: 40.43 KG/M2 | WEIGHT: 315 LBS

## 2025-07-04 LAB
ABSOLUTE EOSINOPHIL (OHS): 0.13 K/UL
ABSOLUTE MONOCYTE (OHS): 0.66 K/UL (ref 0.3–1)
ABSOLUTE NEUTROPHIL COUNT (OHS): 4.81 K/UL (ref 1.8–7.7)
ANION GAP (OHS): 12 MMOL/L (ref 8–16)
BASOPHILS # BLD AUTO: 0.03 K/UL
BASOPHILS NFR BLD AUTO: 0.4 %
BUN SERPL-MCNC: 23 MG/DL (ref 8–23)
CALCIUM SERPL-MCNC: 8.9 MG/DL (ref 8.7–10.5)
CHLORIDE SERPL-SCNC: 103 MMOL/L (ref 95–110)
CO2 SERPL-SCNC: 24 MMOL/L (ref 23–29)
CREAT SERPL-MCNC: 1.4 MG/DL (ref 0.5–1.4)
ERYTHROCYTE [DISTWIDTH] IN BLOOD BY AUTOMATED COUNT: 13.3 % (ref 11.5–14.5)
GFR SERPLBLD CREATININE-BSD FMLA CKD-EPI: 56 ML/MIN/1.73/M2
GLUCOSE SERPL-MCNC: 105 MG/DL (ref 70–110)
HCT VFR BLD AUTO: 48.9 % (ref 40–54)
HGB BLD-MCNC: 15 GM/DL (ref 14–18)
IMM GRANULOCYTES # BLD AUTO: 0.02 K/UL (ref 0–0.04)
IMM GRANULOCYTES NFR BLD AUTO: 0.3 % (ref 0–0.5)
LYMPHOCYTES # BLD AUTO: 1.7 K/UL (ref 1–4.8)
MCH RBC QN AUTO: 28.1 PG (ref 27–31)
MCHC RBC AUTO-ENTMCNC: 30.7 G/DL (ref 32–36)
MCV RBC AUTO: 92 FL (ref 82–98)
NUCLEATED RBC (/100WBC) (OHS): 0 /100 WBC
PLATELET # BLD AUTO: 150 K/UL (ref 150–450)
PMV BLD AUTO: ABNORMAL FL
POTASSIUM SERPL-SCNC: 3.6 MMOL/L (ref 3.5–5.1)
RBC # BLD AUTO: 5.33 M/UL (ref 4.6–6.2)
RELATIVE EOSINOPHIL (OHS): 1.8 %
RELATIVE LYMPHOCYTE (OHS): 23.1 % (ref 18–48)
RELATIVE MONOCYTE (OHS): 9 % (ref 4–15)
RELATIVE NEUTROPHIL (OHS): 65.4 % (ref 38–73)
SODIUM SERPL-SCNC: 139 MMOL/L (ref 136–145)
WBC # BLD AUTO: 7.35 K/UL (ref 3.9–12.7)

## 2025-07-04 PROCEDURE — 85025 COMPLETE CBC W/AUTO DIFF WBC: CPT | Performed by: STUDENT IN AN ORGANIZED HEALTH CARE EDUCATION/TRAINING PROGRAM

## 2025-07-04 PROCEDURE — 25000003 PHARM REV CODE 250: Performed by: PHYSICIAN ASSISTANT

## 2025-07-04 PROCEDURE — 82310 ASSAY OF CALCIUM: CPT | Performed by: PHYSICIAN ASSISTANT

## 2025-07-04 PROCEDURE — 36415 COLL VENOUS BLD VENIPUNCTURE: CPT | Performed by: STUDENT IN AN ORGANIZED HEALTH CARE EDUCATION/TRAINING PROGRAM

## 2025-07-04 PROCEDURE — 25000003 PHARM REV CODE 250: Performed by: INTERNAL MEDICINE

## 2025-07-04 PROCEDURE — 25000003 PHARM REV CODE 250: Performed by: STUDENT IN AN ORGANIZED HEALTH CARE EDUCATION/TRAINING PROGRAM

## 2025-07-04 PROCEDURE — 36415 COLL VENOUS BLD VENIPUNCTURE: CPT | Performed by: PHYSICIAN ASSISTANT

## 2025-07-04 RX ADMIN — ASPIRIN 81 MG CHEWABLE TABLET 81 MG: 81 TABLET CHEWABLE at 08:07

## 2025-07-04 RX ADMIN — GABAPENTIN 600 MG: 300 CAPSULE ORAL at 08:07

## 2025-07-04 RX ADMIN — CETIRIZINE HYDROCHLORIDE 10 MG: 10 TABLET, FILM COATED ORAL at 08:07

## 2025-07-04 RX ADMIN — SPIRONOLACTONE 12.5 MG: 25 TABLET ORAL at 08:07

## 2025-07-04 RX ADMIN — POTASSIUM CHLORIDE 40 MEQ: 1500 TABLET, EXTENDED RELEASE ORAL at 08:07

## 2025-07-04 RX ADMIN — AMLODIPINE BESYLATE 5 MG: 5 TABLET ORAL at 08:07

## 2025-07-04 RX ADMIN — PANTOPRAZOLE SODIUM 40 MG: 40 TABLET, DELAYED RELEASE ORAL at 08:07

## 2025-07-04 RX ADMIN — APIXABAN 5 MG: 5 TABLET, FILM COATED ORAL at 08:07

## 2025-07-04 RX ADMIN — METOPROLOL SUCCINATE 25 MG: 25 TABLET, EXTENDED RELEASE ORAL at 08:07

## 2025-07-04 RX ADMIN — FUROSEMIDE 40 MG: 40 TABLET ORAL at 08:07

## 2025-07-04 RX ADMIN — SACUBITRIL AND VALSARTAN 1 TABLET: 24; 26 TABLET, FILM COATED ORAL at 08:07

## 2025-07-04 NOTE — PLAN OF CARE
Problem: Adult Inpatient Plan of Care  Goal: Plan of Care Review  Outcome: Progressing  Goal: Patient-Specific Goal (Individualized)  Outcome: Progressing  Goal: Optimal Comfort and Wellbeing  Outcome: Progressing  Goal: Readiness for Transition of Care  Outcome: Progressing     Problem: Wound  Goal: Optimal Coping  Outcome: Progressing  Goal: Optimal Wound Healing  Outcome: Progressing

## 2025-07-04 NOTE — ASSESSMENT & PLAN NOTE
"Patient has Systolic (HFrEF) heart failure that is Acute. On presentation their CHF was decompensated. Evidence of decompensated CHF on presentation includes: edema, crackles on lung auscultation, orthopnea, and paroxysmal nocturnal dyspnea (PND). The etiology of their decompensation is likely unknown/salt intake. Most recent BNP and echo results are listed below.  No results for input(s): "BNP" in the last 72 hours.    Latest ECHO  Results for orders placed during the hospital encounter of 07/01/25    Echo    Interpretation Summary    Left Ventricle: The left ventricle is mildly dilated. There is moderate eccentric hypertrophy. There is moderately reduced systolic function with a visually estimated ejection fraction of 30 - 35%.    Right Ventricle: The right ventricle is normal in size measuring 3.3 cm. Systolic function is normal.    Left Atrium: The left atrium is severely dilated    Right Atrium: The right atrium is mildly dilated .    Aortic Valve: There is mild aortic regurgitation.    Mitral Valve: There is mild regurgitation.    Aorta: The aortic root is moderately dilated measuring 4.8 cm. The proximal ascending aorta is moderately dilated measuring 4.3 cm.    Pulmonary Artery: The estimated pulmonary artery systolic pressure is 29 mmHg.    IVC/SVC: Intermediate venous pressure at 8 mmHg.    Pericardium: There is a trivial posterior effusion.    Current Heart Failure Medications  , 2 times daily, Oral  metoprolol succinate (TOPROL-XL) 24 hr tablet, Daily, Oral  furosemide (LASIX) tablet, 2 times daily, Oral  spironolactone (ALDACTONE) tablet, Daily, Oral    Plan  - Monitor strict I&Os and daily weights.    - Place on telemetry  - Low sodium diet  - Place on fluid restriction of 1.5 L.   - Cardiology has been consulted  - The patient's volume status is improving but not at their baseline as indicated by edema and crackles on lung auscultation  - plan for ischemic work up 7/2 - non ischemic CM  - transition " to PO lasix  - add spironolactone

## 2025-07-04 NOTE — NURSING
[facility] [facility] [place] 4 [place]      7/3/2025       Skin assessed on: Q Shift      [x] No Pressure Injuries Present    []Prevention Measures Documented    [] Yes LDA  for Pressure Injury Previously documented     [] Yes New Pressure Injury Discovered   [] LDA for New Pressure Injury Added      Attending RN:  Steve Metz RN     Second RN:  So [place]

## 2025-07-04 NOTE — ASSESSMENT & PLAN NOTE
Patient has persistent (7 days or more) atrial fibrillation. Patient is currently in atrial fibrillation. [id number] Score: The patient doesn't have any registry metric data available. The patients heart rate in the last 24 hours is as follows:  Pulse  Min: 67  Max: 81     Antiarrhythmics  metoprolol succinate (TOPROL-XL) 24 hr tablet, Daily, Oral    Anticoagulants  apixaban tablet, 2 times daily, Oral    Plan  - Replete lytes with a goal of K>4, Mg >2  - Patient is anticoagulated, see medications listed above.  - Patient's afib is currently controlled  - s/p PEYTON/CV 7/3

## 2025-07-04 NOTE — ANESTHESIA POSTPROCEDURE EVALUATION
Anesthesia Post Evaluation    Patient: Dru Baron    Procedure(s) Performed: Procedure(s) (LRB):  Transesophageal echo (PEYTON) intra-procedure log documentation (N/A)  Transesophageal echo (PEYTON) intra-procedure log documentation (N/A)    Final Anesthesia Type: general      Patient location during evaluation: PACU  Patient participation: Yes- Able to Participate  Level of consciousness: awake and alert and oriented  Post-procedure vital signs: reviewed and stable  Pain management: adequate  Airway patency: patent    PONV status at discharge: No PONV  Anesthetic complications: no      Cardiovascular status: blood pressure returned to baseline and hemodynamically stable  Respiratory status: unassisted, spontaneous ventilation and room air  Hydration status: euvolemic  Follow-up not needed.              Vitals Value Taken Time   /85 07/04/25 04:52   Temp 37 °C (98.6 °F) 07/04/25 04:52   Pulse 73 07/04/25 04:52   Resp 18 07/04/25 04:52   SpO2 92 % 07/04/25 04:52         No case tracking events are documented in the log.      Pain/Noni Score: No data recorded

## 2025-07-04 NOTE — ASSESSMENT & PLAN NOTE
Presents with troponin of 0.056 in setting of HTN and pulmonary edema. Denies chest pain, EKG without ST elevation  Troponin trend  Telemetry  Echo  Continue aspirin  - Cardiology consulted  - [facility] 7/2 with no obstructive CAD

## 2025-07-04 NOTE — PLAN OF CARE
Case Management Final Discharge Note    Discharge Disposition: Home    New DME ordered / company name: None    Relevant SDOH / Transition of Care Barriers:  None     Person available to provide assistance at home when needed and their contact information: Extended Emergency Contact Information  Primary Emergency Contact: Christel Baron  Mobile Phone: [phone]  Relation: Spouse  Preferred language: English   needed? No     Scheduled followup appointment: PCP - 07/08/25, [facility] 07/10/25    Referrals placed: Sleep Disorder    Transportation: Family will provide transportation home    Patient and family educated on discharge services and updated on DC plan. Bedside LPN So Mart notified, patient clear to discharge from Case Management Perspective.     07/04/25 0921   Final Note   Assessment Type Final Discharge Note   Anticipated Discharge Disposition Home   What phone number can be called within the next 1-3 days to see how you are doing after discharge? [phone]   Hospital Resources/Appts/Education Provided Appointments scheduled and added to AVS   Post-Acute Status   Discharge Delays None known at this time

## 2025-07-04 NOTE — DISCHARGE INSTRUCTIONS
Anticoagulants  Tell ALL your doctors, dentists, nurses, and pharmacists that you are taking a blood-thinning drug. Carry a card in your wallet or wear a MedicAlert bracelet or necklace. This will help others know to not order a drug that interacts or adds to the blood-thinning drug you are taking.  Talk with your doctor before you take drugs like celecoxib, Celebrex, naproxen, Aleve, Naprosyn, ibuprofen, Advil, or Motrin.  Protect yourself from bruising and bleeding.  Avoid activities or places where you could be bruised, cut, hurt, or fall.  Protect your hands from cuts. Take extra care when using knives or tools. Wear gloves when you work in the garden or around the house.  Use electric razors when shaving. Avoid using scissors and nail clippers.  Brush your teeth gently. Use a toothbrush with soft bristles. This will help to avoid bleeding from your gums.  Wear shoes or slippers on your feet at all times.  Blow your nose gently.  Use a stool softener so you will not have to strain with bowel movements. Do not use an enema or suppositories.  If you start to bleed:  Apply pressure to the area until the bleeding stops. This will take several minutes longer than usual because of the blood-thinning drug. Ice may also help. You may need to call for emergency help or go to the nearest emergency room.  For a nose bleed, put pressure and ice on the bridge of your nose. If the bleeding does not stop in 5 to 10 minutes or the bleeding is very heavy, go to the nearest emergency room.  If hit your head go to the nearest emergency room for evaluation          Our goal at [facility] is to always give you outstanding care and exceptional service. You may receive a survey from [employer] by mail, text or e-mail in the next 24-48 hours asking about the care you received with us. The survey should only take 5-10 minutes to complete and is very important to us.     Your feedback provides us with a way to recognize our staff who work  tirelessly to provide the best care! Also, your responses help us learn how to improve when your experience was below our aspiration of excellence. We are always looking for ways to improve your stay. We WILL use your feedback to continue making improvements to help us provide the highest quality care. We keep your personal information and feedback confidential. We appreciate your time completing this survey and can't wait to hear from you!!!    We look forward to your continued care with us! Thanks so much for choosing [facility] for your healthcare needs!

## 2025-07-04 NOTE — DISCHARGE SUMMARY
[facility] Medicine  Discharge Summary      Patient Name: Dru Baron  MRN: [id number]  ERICKA: [id number]  Patient Class: IP- Inpatient  Admission Date: 7/1/2025  Hospital Length of Stay: 3 days  Discharge Date and Time: 7/4/2025 11:41 AM  Attending Physician: No att. providers found   Discharging Provider: Rod Whitaker MD  Primary Care Provider: Miko Mane MD    Primary Care Team:  HOSP MED 2    HPI:   Dru Baron 64 y.o. male with HTN and arthritis presents to the hospital with a chief complaint of shortness breath.  He reports 5 days of shortness a breath with associated lower extremity edema nonproductive cough.  He finds his shortness breath worsens with lying flat as improved with IV Lasix in the emergency room.  He has never experienced symptoms such as this in the past.  He denies any history of heart failure.  He has been compliant with his home amlodipine.  He denies fever nausea vomiting abdominal pain melena hematuria hematemesis dizziness and syncope.    In the ED, afebrile without leukocytosis EKG without ST elevation chest x-ray with pulmonary edema troponin 0.056  COVID and flu negative.    Procedure(s) (LRB):  Transesophageal echo (PEYTON) intra-procedure log documentation (N/A)  Transesophageal echo (PEYTON) intra-procedure log documentation (N/A)      Hospital Course:   Mr. Baron is a 63 yo M admitted with new onset acute CHF exacerbation. Lasix was started. Echo ordered. TTE with HFrEF 30-35%, moderate LV eccentric hypertrophy. Aortic root moderately dilated at 4.8 cm and proximal ascending aorta moderately dilated at 4.3 cm. Cardiology consulted. Started on entresto and Toprol. He underwent angiogram with no obstructive CAD, CHF nonischemic. Also found to be in afib, underwent PEYTON/CV the following day and now in sinus rhythm. Added spironolactone. Tolerating meds well. He has follow up with [facility] next week. Meds delivered to bedside. All questions answered. Referal  placed for sleep study. Discharged home in  stable condition.     Goals of Care Treatment Preferences:  Code Status: Full Code         Consults:   Consults (From admission, onward)          Status Ordering Provider     Inpatient consult to Cardiology  Once        Provider:  Fortino Chung MD    Completed SALVADOR DAVIDSON     Inpatient consult to Cardiology  Once        Provider:  Fortino Chung MD    Completed VERNON SHELDON     Inpatient consult to Social Work/Case Management  Once        Provider:  (Not yet assigned)    Completed SALVADOR DAVIDSON     Inpatient consult to Registered Dietitian/Nutritionist  Once        Provider:  (Not yet assigned)    Completed SALVADOR DAVIDSON            Assessment & Plan  Pulmonary edema  Presents with SOB, leg edema, and BNP of 940 concerning for new onset CHF. Without hypoxia on RA.   Continue IV lasix  Echo  Troponin trend/telemetry  Daily weights/strict intake and output  Essential hypertension  Patient's blood pressure range in the last 24 hours was: BP  Min: 100/67  Max: 128/77.The patient's inpatient anti-hypertensive regimen is listed below:  Current Antihypertensives  metoprolol succinate (TOPROL-XL) 24 hr tablet, Daily, Oral  furosemide (LASIX) tablet, 2 times daily, Oral  spironolactone (ALDACTONE) tablet, Daily, Oral    Plan  - BP is controlled, no changes needed to their regimen  - continue home amlodipine, start entresto/toprol  Elevated troponin  Presents with troponin of 0.056 in setting of HTN and pulmonary edema. Denies chest pain, EKG without ST elevation  Troponin trend  Telemetry  Echo  Continue aspirin  - Cardiology consulted  - [facility] 7/2 with no obstructive CAD  Acute systolic congestive heart failure  Patient has Systolic (HFrEF) heart failure that is Acute. On presentation their CHF was decompensated. Evidence of decompensated CHF on presentation includes: edema, crackles on lung auscultation, orthopnea, and paroxysmal nocturnal dyspnea (PND). The  "etiology of their decompensation is likely unknown/salt intake. Most recent BNP and echo results are listed below.  No results for input(s): "BNP" in the last 72 hours.    Latest ECHO  Results for orders placed during the hospital encounter of 07/01/25    Echo    Interpretation Summary    Left Ventricle: The left ventricle is mildly dilated. There is moderate eccentric hypertrophy. There is moderately reduced systolic function with a visually estimated ejection fraction of 30 - 35%.    Right Ventricle: The right ventricle is normal in size measuring 3.3 cm. Systolic function is normal.    Left Atrium: The left atrium is severely dilated    Right Atrium: The right atrium is mildly dilated .    Aortic Valve: There is mild aortic regurgitation.    Mitral Valve: There is mild regurgitation.    Aorta: The aortic root is moderately dilated measuring 4.8 cm. The proximal ascending aorta is moderately dilated measuring 4.3 cm.    Pulmonary Artery: The estimated pulmonary artery systolic pressure is 29 mmHg.    IVC/SVC: Intermediate venous pressure at 8 mmHg.    Pericardium: There is a trivial posterior effusion.    Current Heart Failure Medications  , 2 times daily, Oral  metoprolol succinate (TOPROL-XL) 24 hr tablet, Daily, Oral  furosemide (LASIX) tablet, 2 times daily, Oral  spironolactone (ALDACTONE) tablet, Daily, Oral    Plan  - Monitor strict I&Os and daily weights.    - Place on telemetry  - Low sodium diet  - Place on fluid restriction of 1.5 L.   - Cardiology has been consulted  - The patient's volume status is improving but not at their baseline as indicated by edema and crackles on lung auscultation  - plan for ischemic work up 7/2 - non ischemic CM  - transition to PO lasix  - add spironolactone          Chronic atrial fibrillation  Patient has persistent (7 days or more) atrial fibrillation. Patient is currently in atrial fibrillation. [id number] Score: The patient doesn't have any registry metric data " available. The patients heart rate in the last 24 hours is as follows:  Pulse  Min: 67  Max: 81     Antiarrhythmics  metoprolol succinate (TOPROL-XL) 24 hr tablet, Daily, Oral    Anticoagulants  apixaban tablet, 2 times daily, Oral    Plan  - Replete lytes with a goal of K>4, Mg >2  - Patient is anticoagulated, see medications listed above.  - Patient's afib is currently controlled  - s/p PEYTON/CV 7/3      Final Active Diagnoses:    Diagnosis Date Noted POA    PRINCIPAL PROBLEM:  Pulmonary edema [J81.1] 07/01/2025 Yes    Acute systolic congestive heart failure [I50.21] 07/02/2025 Yes    Chronic atrial fibrillation [I48.20] 07/02/2025 Yes    Elevated troponin [R79.89] 07/01/2025 Yes    Essential hypertension [I10] 12/03/2021 Yes      Problems Resolved During this Admission:       Discharged Condition: stable    Disposition: Home or Self Care    Follow Up:   Follow-up Information       Miko Mane MD. Go on 7/8/2025.    Specialty: Internal Medicine  Why: To schedule appointment at 10:00 am.  Contact information:  [address]  [place] [address]  [phone]                           Patient Instructions:      Ambulatory referral/consult to Sleep Disorders   Standing Status: Future   Referral Priority: Routine Referral Type: Consultation   Requested Specialty: Sleep Medicine   Number of Visits Requested: 1     Diet Cardiac     Call MD for:  temperature >100.4     Call MD for:  persistent nausea and vomiting or diarrhea     Call MD for:  severe uncontrolled pain     Call MD for:  redness, tenderness, or signs of infection (pain, swelling, redness, odor or green/yellow discharge around incision site)     Call MD for:  difficulty breathing or increased cough     Call MD for:  severe persistent headache     Call MD for:  worsening rash     Call MD for:  persistent dizziness, light-headedness, or visual disturbances     Call MD for:  increased confusion or weakness     Activity as tolerated       Significant  Diagnostic Studies: Labs: All labs within the past 24 hours have been reviewed    Pending Diagnostic Studies:       None           Medications:  Reconciled Home Medications:      Medication List        START taking these medications      aspirin 81 MG Chew  Chew and swallow 1 tablet (81 mg total) by mouth once daily.  Notes to patient: BLOOD THINNER     ELIQUIS 5 mg Tab  Generic drug: apixaban  Take 1 tablet (5 mg total) by mouth 2 (two) times daily.  Notes to patient: BLOOD THINNER- MONITOR FOR S/S OF BLEEDING     ENTRESTO 24-26 mg per tablet  Generic drug: sacubitriL-valsartan  Take 1 tablet by mouth 2 (two) times daily.  Notes to patient: BLOOD PRESSURE MEDICATION     furosemide 40 MG tablet  Commonly known as: LASIX  Take 1 tablet (40 mg total) by mouth 2 (two) times daily.  Notes to patient: DIURETIC-FLUID REMOVAL     metoprolol succinate 25 MG 24 hr tablet  Commonly known as: TOPROL-XL  Take 1 tablet (25 mg total) by mouth once daily.  Notes to patient: BLOOD PRESSURE      spironolactone 25 MG tablet  Commonly known as: ALDACTONE  Take 1 tablet (25 mg total) by mouth once daily.  Notes to patient: DIURETIC- REMOVE FLUID            CONTINUE taking these medications      amLODIPine 5 MG tablet  Commonly known as: NORVASC  Take 1 tablet (5 mg total) by mouth once daily.     * ammonium lactate 12 % Crea  Apply twice daily to affected parts both feet as needed.     * ammonium lactate 12 % Crea  Apply 1 application topically 2 (two) times daily.     * fluticasone propionate 50 mcg/actuation nasal spray  Commonly known as: FLONASE  by Each Nostril route.     * fluticasone propionate 50 mcg/actuation nasal spray  Commonly known as: FLONASE  USE 2 SPRAYS IN EACH NOSTRIL ONCE DAILY     gabapentin 600 MG tablet  Commonly known as: NEURONTIN  Take 600 mg by mouth 2 (two) times a day.     pantoprazole 40 MG tablet  Commonly known as: PROTONIX  Take 1 tablet (40 mg total) by mouth 2 (two) times daily.           * This list  has 4 medication(s) that are the same as other medications prescribed for you. Read the directions carefully, and ask your doctor or other care provider to review them with you.                  Indwelling Lines/Drains at time of discharge:   Lines/Drains/Airways       None                       Time spent on the discharge of patient: >35 minutes         Rod Whitaker MD  Department of Hospital Medicine  [facility]

## 2025-07-04 NOTE — ASSESSMENT & PLAN NOTE
Patient's blood pressure range in the last 24 hours was: BP  Min: 100/67  Max: 128/77.The patient's inpatient anti-hypertensive regimen is listed below:  Current Antihypertensives  metoprolol succinate (TOPROL-XL) 24 hr tablet, Daily, Oral  furosemide (LASIX) tablet, 2 times daily, Oral  spironolactone (ALDACTONE) tablet, Daily, Oral    Plan  - BP is controlled, no changes needed to their regimen  - continue home amlodipine, start entresto/toprol

## 2025-07-05 LAB
OHS QRS DURATION: 114 MS
OHS QTC CALCULATION: 457 MS

## 2025-07-07 NOTE — PROGRESS NOTES
[facility] Provider Note (Initial Clinic) Consult Note    Age: 64 y.o.  Gender: male  Ethnicity:  or /a   Number of admissions for CHF within the preceding year: 1   Type of Congestive Heart Failure: Systolic     Diagnostic Labs:   EKG - 07/05/2025  CXR - 07/01/2025  ECHO - 07/03/2025  Stress test -   Stress echo -   Pharmacologic stress -   Cardiac catheterization - 07/02/2025   Cardiac MRI -     Lab Results   Component Value Date     07/04/2025     07/03/2025    K 3.6 07/04/2025    K 3.3 (L) 07/03/2025     07/04/2025     07/03/2025    CO2 24 07/04/2025    CO2 26 07/03/2025     07/04/2025     07/03/2025    BUN 23 07/04/2025    BUN 11 07/03/2025    CREATININE 1.4 07/04/2025    CREATININE 0.9 07/03/2025    CALCIUM 8.9 07/04/2025    CALCIUM 8.9 07/03/2025    PROT 7.5 07/01/2025    PROT 7.6 05/07/2023    ALBUMIN 3.8 07/01/2025    ALBUMIN 3.9 05/07/2023    BILITOT 1.5 (H) 07/01/2025    BILITOT 1.1 (H) 05/07/2023    ALKPHOS 89 07/01/2025    ALKPHOS 101 05/07/2023    AST 20 07/01/2025    AST 19 05/07/2023    ALT 8 (L) 07/01/2025    ALT 16 05/07/2023    ANIONGAP 12 07/04/2025    ANIONGAP 11 07/03/2025    ESTGFRAFRICA >60 12/06/2021    ESTGFRAFRICA >60 12/05/2021    EGFRNONAA >60 12/06/2021    EGFRNONAA >60 12/05/2021       Lab Results   Component Value Date    WBC 7.35 07/04/2025    WBC 8.55 07/01/2025    RBC 5.33 07/04/2025    RBC 5.04 07/01/2025    HGB 15.0 07/04/2025    HGB 14.3 07/01/2025    HCT 48.9 07/04/2025    HCT 46.1 07/01/2025    MCV 92 07/04/2025    MCV 92 07/01/2025    MCH 28.1 07/04/2025    MCH 28.4 07/01/2025    MCHC 30.7 (L) 07/04/2025    MCHC 31.0 (L) 07/01/2025    RDW 13.3 07/04/2025    RDW 13.1 07/01/2025     07/04/2025     07/01/2025    MPV  07/04/2025      Comment:      Result Not Available    MPV  07/01/2025      Comment:      Result Not Available    IMMGR 0.3 07/04/2025    IMMGR 0.2 07/01/2025    IGABS 0.02 07/04/2025    IGABS 0.02  07/01/2025    LYMPH 23.1 07/04/2025    LYMPH 1.70 07/04/2025    MONO 9.0 07/04/2025    MONO 0.66 07/04/2025    EOS 1.8 07/04/2025    EOS 0.13 07/04/2025    BASO 0.05 05/07/2023    BASO 0.04 01/25/2022    NRBC 0 07/04/2025    NRBC 0 07/01/2025    GRAN 7.8 (H) 05/07/2023    GRAN 71.0 05/07/2023    EOSINOPHIL 1.0 05/07/2023    EOSINOPHIL 6.1 01/25/2022    BASOPHIL 0.4 07/04/2025    BASOPHIL 0.03 07/04/2025    PLTEST Appears normal 05/07/2023    PLTEST Decreased (A) 12/04/2021    ANISO Slight 12/04/2021    HYPO Occasional 12/04/2021       Lab Results   Component Value Date     (H) 07/01/2025    BNP 62 05/07/2023    MG 1.8 07/01/2025    MG 2.1 12/03/2021    TROPONINI 0.070 (H) 07/01/2025    TROPONINI 0.056 (H) 07/01/2025    HGBA1C 4.8 07/01/2025    TSH 2.205 07/02/2025    TSH 1.860 12/03/2021       Lab Results   Component Value Date    IRON 19 (L) 12/03/2021    TIBC 440 12/03/2021    FERRITIN 16 (L) 12/03/2021    CHOL 146 07/02/2025    TRIG 63 07/02/2025    HDL 46 07/02/2025    LDLCALC 87.4 07/02/2025    CHOLHDL 31.5 07/02/2025    TOTALCHOLEST 3.2 07/02/2025    NONHDLCHOL 100 07/02/2025    COLORU Yellow 05/07/2023    APPEARANCEUA Hazy (A) 05/07/2023    PHUR 7.0 05/07/2023    SPECGRAV 1.015 05/07/2023    PROTEINUA 1+ (A) 05/07/2023    GLUCUA Negative 05/07/2023    KETONESU Negative 05/07/2023    BILIRUBINUA Negative 05/07/2023    OCCULTUA Negative 05/07/2023    NITRITE Negative 05/07/2023    LEUKOCYTESUR Negative 05/07/2023       Current Outpatient Medications on File Prior to Visit   Medication Sig Dispense Refill    amLODIPine (NORVASC) 5 MG tablet Take 1 tablet (5 mg total) by mouth once daily. 30 tablet 0    ammonium lactate 12 % Crea Apply twice daily to affected parts both feet as needed. 140 g 11    ammonium lactate 12 % Crea Apply 1 application topically 2 (two) times daily.      apixaban (ELIQUIS) 5 mg Tab Take 1 tablet (5 mg total) by mouth 2 (two) times daily. 60 tablet 3    aspirin 81 MG Chew Chew and  swallow 1 tablet (81 mg total) by mouth once daily. 30 tablet 3    fluticasone propionate (FLONASE) 50 mcg/actuation nasal spray USE 2 SPRAYS IN EACH NOSTRIL ONCE DAILY 48 mL 3    fluticasone propionate (FLONASE) 50 mcg/actuation nasal spray by Each Nostril route.      furosemide (LASIX) 40 MG tablet Take 1 tablet (40 mg total) by mouth 2 (two) times daily. 60 tablet 11    gabapentin (NEURONTIN) 600 MG tablet Take 600 mg by mouth 2 (two) times a day.      metoprolol succinate (TOPROL-XL) 25 MG 24 hr tablet Take 1 tablet (25 mg total) by mouth once daily. 90 tablet 3    pantoprazole (PROTONIX) 40 MG tablet Take 1 tablet (40 mg total) by mouth 2 (two) times daily. 30 tablet 2    sacubitriL-valsartan (ENTRESTO) 24-26 mg per tablet Take 1 tablet by mouth 2 (two) times daily. 180 tablet 0    spironolactone (ALDACTONE) 25 MG tablet Take 1 tablet (25 mg total) by mouth once daily. 30 tablet 11     No current facility-administered medications on file prior to visit.         HPI:  Patient can walk without limitation   Patient sleeps on 3 number of pillows at baseline   Patient wakes up SOB, has to get out of bed, associated cough, sputum and color-denies   Palpitations - denies   Dizzy, light-headed, pre-syncope or syncope-denies   Since discharge frequency of performing weights, home weight and weight change-[id number]   Other information felt pertinent to HPI: 64 y.o. male with HTN and arthritis presents to the hospital with a chief complaint of shortness breath.  He reports 5 days of shortness a breath with associated lower extremity edema nonproductive cough.  He finds his shortness breath worsens with lying flat as improved with IV Lasix in the emergency room.  He has never experienced symptoms such as this in the past.  He denies any history of heart failure.  He has been compliant with his home amlodipine.  He denies fever nausea vomiting abdominal pain melena hematuria hematemesis dizziness and syncope.     In the ED,  afebrile without leukocytosis EKG without ST elevation chest x-ray with pulmonary edema troponin 0.056  COVID and flu negative.     Procedure(s) (LRB):  Transesophageal echo (PEYTON) intra-procedure log documentation (N/A)  Transesophageal echo (PEYTON) intra-procedure log documentation (N/A)       Hospital Course:   Mr. Baron is a 65 yo M admitted with new onset acute CHF exacerbation. Lasix was started. Echo ordered. TTE with HFrEF 30-35%, moderate LV eccentric hypertrophy. Aortic root moderately dilated at 4.8 cm and proximal ascending aorta moderately dilated at 4.3 cm. Cardiology consulted. Started on entresto and Toprol. He underwent angiogram with no obstructive CAD, CHF nonischemic. Also found to be in afib, underwent PEYTON/CV the following day and now in sinus rhythm. Added spironolactone. Tolerating meds well. He has follow up with [facility] next week. Meds delivered to bedside. All questions answered. Referal placed for sleep study. Discharged home in  stable condition.     Patient seen in [facility] for first visit post discharge after recent hospitalization for afib and CHF exacerbation. He reports today with sustained weight loss and is 310 currently at home. He states he has been following a salt and fluid restriction, and endorses compliance with his medications. He states he has been running 130s systolic blood pressure in the morning but the last couple of days his night time reading was 80s systolic. He denied symptoms of hypotension. He is euvolemic on physical exam.     PHYSICAL:   Vitals:    07/09/25 1329   BP: 110/60   Pulse: 80   Resp: 16        JVD: no   Heart rhythm: regular  Cardiac murmur: No    S3: no  S4: no  Lungs: clear  Hepatojugular reflux: no  Edema: no      Echo 7/1/25:      Left Ventricle: The left ventricle is mildly dilated. There is moderate eccentric hypertrophy. There is moderately reduced systolic function with a visually estimated ejection fraction of 30 - 35%.    Right Ventricle:  The right ventricle is normal in size measuring 3.3 cm. Systolic function is normal.    Left Atrium: The left atrium is severely dilated    Right Atrium: The right atrium is mildly dilated .    Aortic Valve: There is mild aortic regurgitation.    Mitral Valve: There is mild regurgitation.    Aorta: The aortic root is moderately dilated measuring 4.8 cm. The proximal ascending aorta is moderately dilated measuring 4.3 cm.    Pulmonary Artery: The estimated pulmonary artery systolic pressure is 29 mmHg.    IVC/SVC: Intermediate venous pressure at 8 mmHg.    Pericardium: There is a trivial posterior effusion.       1. HFrEF (heart failure with reduced ejection fraction)     Recommend 2-3 gram sodium restriction and 1500cc- 2000cc fluid restriction.  Call clinic for increased shortness of breath, or increased swelling.  Weigh yourself every day and call the office if your weight increases by more than 3 lbs in 1 day or 5 lbs in 3 days.   Pending lab results, Start Jardiance 10mg once daily-patient instructed to eat with medication, he denies frequent UTI  Reduced lasix to 40mg once daily (with introduction of jardiance and to further support night time blood pressure), you may take an extra 40mg in the afternoon for weight gain of 2-3lbs in a day, increasing shortness of breath, and increased swelling in your legs.   Continue GDMT    2. Essential hypertension     Continue antihypertensives    3. Chronic atrial fibrillation      On eliquis and BB    4. Obesity, morbid, BMI 40.0-49.9       Counseled on healthy diet and weight loss        Referral to bariatric medicine placed

## 2025-07-09 ENCOUNTER — TELEPHONE (OUTPATIENT)
Facility: CLINIC | Age: 65
End: 2025-07-09

## 2025-07-09 ENCOUNTER — DOCUMENTATION ONLY (OUTPATIENT)
Facility: CLINIC | Age: 65
End: 2025-07-09

## 2025-07-09 ENCOUNTER — OFFICE VISIT (OUTPATIENT)
Facility: CLINIC | Age: 65
End: 2025-07-09
Payer: MEDICARE

## 2025-07-09 ENCOUNTER — LAB VISIT (OUTPATIENT)
Dept: LAB | Facility: HOSPITAL | Age: 65
End: 2025-07-09
Payer: MEDICARE

## 2025-07-09 VITALS
HEART RATE: 80 BPM | RESPIRATION RATE: 16 BRPM | OXYGEN SATURATION: 96 % | DIASTOLIC BLOOD PRESSURE: 60 MMHG | SYSTOLIC BLOOD PRESSURE: 110 MMHG

## 2025-07-09 DIAGNOSIS — I50.9 CONGESTIVE HEART FAILURE, UNSPECIFIED HF CHRONICITY, UNSPECIFIED HEART FAILURE TYPE: ICD-10-CM

## 2025-07-09 DIAGNOSIS — I48.20 CHRONIC ATRIAL FIBRILLATION: ICD-10-CM

## 2025-07-09 DIAGNOSIS — I50.20 HFREF (HEART FAILURE WITH REDUCED EJECTION FRACTION): Primary | ICD-10-CM

## 2025-07-09 DIAGNOSIS — I10 ESSENTIAL HYPERTENSION: ICD-10-CM

## 2025-07-09 DIAGNOSIS — E66.01 OBESITY, MORBID, BMI 40.0-49.9: ICD-10-CM

## 2025-07-09 LAB
ABSOLUTE EOSINOPHIL (OHS): 0.12 K/UL
ABSOLUTE MONOCYTE (OHS): 0.59 K/UL (ref 0.3–1)
ABSOLUTE NEUTROPHIL COUNT (OHS): 4.91 K/UL (ref 1.8–7.7)
ALBUMIN SERPL BCP-MCNC: 4.1 G/DL (ref 3.5–5.2)
ALP SERPL-CCNC: 93 UNIT/L (ref 40–150)
ALT SERPL W/O P-5'-P-CCNC: 37 UNIT/L (ref 10–44)
ANION GAP (OHS): 14 MMOL/L (ref 8–16)
AST SERPL-CCNC: 28 UNIT/L (ref 11–45)
BASOPHILS # BLD AUTO: 0.05 K/UL
BASOPHILS NFR BLD AUTO: 0.7 %
BILIRUB SERPL-MCNC: 1.7 MG/DL (ref 0.1–1)
BNP SERPL-MCNC: 21 PG/ML (ref 0–99)
BUN SERPL-MCNC: 39 MG/DL (ref 8–23)
CALCIUM SERPL-MCNC: 9.5 MG/DL (ref 8.7–10.5)
CHLORIDE SERPL-SCNC: 103 MMOL/L (ref 95–110)
CO2 SERPL-SCNC: 24 MMOL/L (ref 23–29)
CREAT SERPL-MCNC: 1.9 MG/DL (ref 0.5–1.4)
ERYTHROCYTE [DISTWIDTH] IN BLOOD BY AUTOMATED COUNT: 13.2 % (ref 11.5–14.5)
GFR SERPLBLD CREATININE-BSD FMLA CKD-EPI: 39 ML/MIN/1.73/M2
GLUCOSE SERPL-MCNC: 113 MG/DL (ref 70–110)
HCT VFR BLD AUTO: 51.4 % (ref 40–54)
HGB BLD-MCNC: 16.2 GM/DL (ref 14–18)
IMM GRANULOCYTES # BLD AUTO: 0.03 K/UL (ref 0–0.04)
IMM GRANULOCYTES NFR BLD AUTO: 0.4 % (ref 0–0.5)
LYMPHOCYTES # BLD AUTO: 1.56 K/UL (ref 1–4.8)
MAGNESIUM SERPL-MCNC: 2.2 MG/DL (ref 1.6–2.6)
MCH RBC QN AUTO: 28.3 PG (ref 27–31)
MCHC RBC AUTO-ENTMCNC: 31.5 G/DL (ref 32–36)
MCV RBC AUTO: 90 FL (ref 82–98)
NUCLEATED RBC (/100WBC) (OHS): 0 /100 WBC
PLATELET # BLD AUTO: 155 K/UL (ref 150–450)
PMV BLD AUTO: ABNORMAL FL
POTASSIUM SERPL-SCNC: 3.7 MMOL/L (ref 3.5–5.1)
PROT SERPL-MCNC: 8.2 GM/DL (ref 6–8.4)
RBC # BLD AUTO: 5.72 M/UL (ref 4.6–6.2)
RELATIVE EOSINOPHIL (OHS): 1.7 %
RELATIVE LYMPHOCYTE (OHS): 21.5 % (ref 18–48)
RELATIVE MONOCYTE (OHS): 8.1 % (ref 4–15)
RELATIVE NEUTROPHIL (OHS): 67.6 % (ref 38–73)
SODIUM SERPL-SCNC: 141 MMOL/L (ref 136–145)
WBC # BLD AUTO: 7.26 K/UL (ref 3.9–12.7)

## 2025-07-09 PROCEDURE — 85025 COMPLETE CBC W/AUTO DIFF WBC: CPT

## 2025-07-09 PROCEDURE — 99999 PR PBB SHADOW E&M-EST. PATIENT-LVL IV: CPT | Mod: PBBFAC,,,

## 2025-07-09 PROCEDURE — 83735 ASSAY OF MAGNESIUM: CPT

## 2025-07-09 PROCEDURE — 36415 COLL VENOUS BLD VENIPUNCTURE: CPT

## 2025-07-09 PROCEDURE — 83880 ASSAY OF NATRIURETIC PEPTIDE: CPT

## 2025-07-09 PROCEDURE — 84460 ALANINE AMINO (ALT) (SGPT): CPT

## 2025-07-09 RX ORDER — FUROSEMIDE 40 MG/1
40 TABLET ORAL DAILY
Qty: 30 TABLET | Refills: 11 | Status: SHIPPED | OUTPATIENT
Start: 2025-07-09 | End: 2026-07-09

## 2025-07-09 NOTE — PROGRESS NOTES
"[facility]) First Week Visit     Pt presents to clinic 07/09/2025 and accompanied by wife Vi.     Most Recent Hospital Discharge Date: 07/04/2025  Last admission Diagnosis/chief complaint:SOB        Visit Vitals:     Wt Readings from Last 3 Encounters:   07/04/25 (!) 166.9 kg (367 lb 15.2 oz)   05/07/23 (!) 158.8 kg (350 lb)   10/17/22 (!) 158.8 kg (350 lb)     Temp Readings from Last 3 Encounters:   07/04/25 98.4 °F (36.9 °C)   05/07/23 97.9 °F (36.6 °C)   03/21/22 97.7 °F (36.5 °C) (Skin)     BP Readings from Last 3 Encounters:   07/04/25 128/77   05/07/23 124/79   03/21/22 (!) 144/88     Pulse Readings from Last 3 Encounters:   07/04/25 67   05/07/23 (!) 51   03/21/22 60            Pt reports the following:  []  Shortness of Breath with activity  []  Shortness of Breath at rest/ sleeping on 2-3 pillows "some days"  []  Fatigue  [x]  Edema   [] Chest pain or tightness  [] Weight Increase since discharge  [] None of the above    Pt reports being in the yellow Zone. If in yellow/red, reminded that they should be calling [facility] today or now.      Medications:     Pt reports having all medications available and understands how to take them appropriately. Reminded pt to call prior to making any changes to medications.      Education:    [x] Gave pt new  / Confirmed pt has  "Heart Failure Transitional Care Clinic Home Care Guide" .   Reviewed key points as listed below.      Recommend 2 gram sodium restriction and 1500cc fluid restriction.  Encourage physical activity with graded exercise program.  Requested patient to weigh themselves daily, and to notify us if their weight increases by more than 3 lbs in 1 day or 5 lbs in 3 days.      [x] Gave / Reviewed "Daily Weight and Symptom Tracker".  Reviewed with patient when and how to call  [facility] according to "Yellow Zone" and "Red Zone".                  [x] Pt given list of low/high sodium foods and Your Heart-Healthy Nutrition " "booklet.                   Watch for these Signs and Symptoms: If any of these occur, contact [facility] immediately:   Increase in shortness of breath with movement   Increase in swelling in your legs and ankles   Weight gain of more than 3 pounds in a night or 5 pounds in 3 days.   Difficulty breathing when you are lying down   Worsening fatigue or tiredness   Stomach bloating, a full feeling or a loss of appetite   Increased coughing--especially when you are lying down     MyChart and Care Companion:              Patient active on myChart? Yes, patient uses regularly.    Contacting our Team:              Reviewed with pt how to contact [facility] RN via phone or [employer]. messaging.      [facility] Program Plan:  Pt educated on follow-up plan while in [facility] program.   [x]  PT given /reviewed upcoming appointment/check in dates. These will include weekly contact with RN or visits with providers over the next 4-6 weeks.                   [x]  Pt educated that they will transitioned to long term care provider team at week 4-6.  This team will be either Cardiology, PCP or Advanced Heart Failure depending on needs.          Pt was able to verbalize back to RN in their own words correct diet/fluid restrictions, necessity for exercise, warning signs and symptoms, when and how to contact their TCC team .       Plan: follow up on 07/16/2025          [x]  Pt given AVS with follow up appointment within 1 week and medication detail list for ease of use.     [x]  Explained to pt they will have a phone "check in" by RN in/on           Will follow up with pt at next clinic visit and RN navigator available for pt questions, issues or concerns.     Please refer to provider visit for additional details and assessment.    "

## 2025-07-09 NOTE — TELEPHONE ENCOUNTER
Called and reviewed lab results with patient and spouse. Creatinine slightly elevated. Instructed to reduce lasix to 40mg once daily at this time and to not start jardiance. Reviewed plan to repeat labs and visit in one week.    Both parties verbalized understanding.

## 2025-07-09 NOTE — PATIENT INSTRUCTIONS
Activity and Diet restrictions:   Recommend 2-3 gram sodium restriction and 1500cc- 2000cc fluid restriction.  Call clinic for increased shortness of breath, or increased swelling.  Weigh yourself every day and call the office if your weight increases by more than 3 lbs in 1 day or 5 lbs in 3 days.     Return to clinic for labs and follow up appointment in one week.      Start jardiance 10 mg once daily    Reduce furosemide (lasix) 40mg to once daily. You may take an extra 40mg in the afternoon for weight gain of 2-3lbs in a day, increased swelling in legs, and increasing shortness of breath.

## 2025-07-22 RX ORDER — METOPROLOL SUCCINATE 25 MG/1
25 TABLET, EXTENDED RELEASE ORAL DAILY
Qty: 90 TABLET | Refills: 3 | Status: SHIPPED | OUTPATIENT
Start: 2025-07-22 | End: 2026-07-22

## 2025-08-06 ENCOUNTER — OFFICE VISIT (OUTPATIENT)
Dept: CARDIOLOGY | Facility: CLINIC | Age: 65
End: 2025-08-06
Payer: MEDICARE

## 2025-08-06 VITALS
OXYGEN SATURATION: 95 % | RESPIRATION RATE: 18 BRPM | SYSTOLIC BLOOD PRESSURE: 130 MMHG | HEART RATE: 62 BPM | WEIGHT: 315 LBS | BODY MASS INDEX: 40.43 KG/M2 | DIASTOLIC BLOOD PRESSURE: 72 MMHG | HEIGHT: 74 IN

## 2025-08-06 DIAGNOSIS — I50.21 ACUTE SYSTOLIC CONGESTIVE HEART FAILURE: Primary | ICD-10-CM

## 2025-08-06 DIAGNOSIS — I50.20 HFREF (HEART FAILURE WITH REDUCED EJECTION FRACTION): ICD-10-CM

## 2025-08-06 DIAGNOSIS — I48.20 CHRONIC ATRIAL FIBRILLATION: ICD-10-CM

## 2025-08-06 DIAGNOSIS — I10 ESSENTIAL HYPERTENSION: ICD-10-CM

## 2025-08-06 PROCEDURE — 4010F ACE/ARB THERAPY RXD/TAKEN: CPT | Mod: CPTII,S$GLB,, | Performed by: INTERNAL MEDICINE

## 2025-08-06 PROCEDURE — 3008F BODY MASS INDEX DOCD: CPT | Mod: CPTII,S$GLB,, | Performed by: INTERNAL MEDICINE

## 2025-08-06 PROCEDURE — 99214 OFFICE O/P EST MOD 30 MIN: CPT | Mod: S$GLB,,, | Performed by: INTERNAL MEDICINE

## 2025-08-06 PROCEDURE — 3075F SYST BP GE 130 - 139MM HG: CPT | Mod: CPTII,S$GLB,, | Performed by: INTERNAL MEDICINE

## 2025-08-06 PROCEDURE — 99999 PR PBB SHADOW E&M-EST. PATIENT-LVL IV: CPT | Mod: PBBFAC,,, | Performed by: INTERNAL MEDICINE

## 2025-08-06 PROCEDURE — 1159F MED LIST DOCD IN RCRD: CPT | Mod: CPTII,S$GLB,, | Performed by: INTERNAL MEDICINE

## 2025-08-06 PROCEDURE — 3078F DIAST BP <80 MM HG: CPT | Mod: CPTII,S$GLB,, | Performed by: INTERNAL MEDICINE

## 2025-08-06 PROCEDURE — 3044F HG A1C LEVEL LT 7.0%: CPT | Mod: CPTII,S$GLB,, | Performed by: INTERNAL MEDICINE

## 2025-08-06 RX ORDER — FUROSEMIDE 40 MG/1
40 TABLET ORAL DAILY PRN
Qty: 30 TABLET | Refills: 11 | Status: SHIPPED | OUTPATIENT
Start: 2025-08-06 | End: 2026-08-06

## 2025-08-06 NOTE — PROGRESS NOTES
Subjective   Patient ID:  Dru Baron is a 65 y.o. male who presents for follow-up of No chief complaint on file.      HPI    PAF on eliquis - PEYTON/CV 7/3/25, NICM - EF 30-35%    Admitted 7/2/25  HPI: Dru Baron 64 y.o. male with HTN and arthritis presents to the hospital with a chief complaint of shortness breath.  He reports 5 days of shortness a breath with associated lower extremity edema nonproductive cough.  He finds his shortness breath worsens with lying flat as improved with IV Lasix in the emergency room.  He has never experienced symptoms such as this in the past.  He denies any history of heart failure.  He has been compliant with his home amlodipine.  He denies fever nausea vomiting abdominal pain melena hematuria hematemesis dizziness and syncope.     In the ED, afebrile without leukocytosis EKG without ST elevation chest x-ray with pulmonary edema troponin 0.056  COVID and flu negative.     SOB for the last week  Less SOB, denies CP  LE edema improving  EKG A-fib - new Dx NSSTT changes    Troponin 0.05 flat  Denies prior CHF         7/3/25 PEYTON/CV EF 30-35%, mild AI and MR, trabeculations noted in JACINTA but no thrombus. 400J shock converted A-fib to NSR       7/2/25 R/LHC - EDP 15, PCWP 12, PA 31/7(20), RV 31/0, RA 0, CO 8.3. Large coronaries with luminal irregularities only co-dominant      Echo 7/1/25    Left Ventricle: The left ventricle is mildly dilated. There is moderate eccentric hypertrophy. There is moderately reduced systolic function with a visually estimated ejection fraction of 30 - 35%.    Right Ventricle: The right ventricle is normal in size measuring 3.3 cm. Systolic function is normal.    Left Atrium: The left atrium is severely dilated    Right Atrium: The right atrium is mildly dilated .    Aortic Valve: There is mild aortic regurgitation.    Mitral Valve: There is mild regurgitation.    Aorta: The aortic root is moderately dilated measuring 4.8 cm. The proximal  ascending aorta is moderately dilated measuring 4.3 cm.    Pulmonary Artery: The estimated pulmonary artery systolic pressure is 29 mmHg.    IVC/SVC: Intermediate venous pressure at 8 mmHg.    Pericardium: There is a trivial posterior effusion.    Labs 7/9/25  K 3.7  Cr 1.9  BNP 21     8/6/25 CORTES much improved since discharge. BP frequently runs low and he feels weak/dizzy  EKG NSR PRWP       Review of Systems   Constitutional: Negative for decreased appetite.   HENT:  Negative for ear discharge.    Eyes:  Negative for blurred vision.   Endocrine: Negative for polyphagia.   Skin:  Negative for nail changes.   Genitourinary:  Negative for bladder incontinence.   Neurological:  Negative for aphonia.   Psychiatric/Behavioral:  Negative for hallucinations.    Allergic/Immunologic: Negative for hives.          Objective     Physical Exam  Constitutional:       Appearance: He is well-developed.   HENT:      Head: Normocephalic and atraumatic.   Eyes:      Conjunctiva/sclera: Conjunctivae normal.      Pupils: Pupils are equal, round, and reactive to light.   Cardiovascular:      Rate and Rhythm: Normal rate.      Pulses: Intact distal pulses.      Heart sounds: Normal heart sounds.   Pulmonary:      Effort: Pulmonary effort is normal.      Breath sounds: Normal breath sounds.   Abdominal:      General: Bowel sounds are normal.      Palpations: Abdomen is soft.   Musculoskeletal:         General: Normal range of motion.      Cervical back: Normal range of motion and neck supple.   Skin:     General: Skin is warm and dry.   Neurological:      Mental Status: He is alert and oriented to person, place, and time.            Assessment and Plan     1. Acute systolic congestive heart failure    2. HFrEF (heart failure with reduced ejection fraction)    3. Chronic atrial fibrillation    4. Essential hypertension        Plan:    Staying in NSR since CV   Stop norvasc with hypotension  Change lasix to PRN with normal BNP and rising  Cr  OV 1 month with BNP, BMP  Repeat echo 2 months - will discuss AICD if EF < 35%  Continue Rx for NICM, CHF, PAF, HTN    Advance Care Planning     Date: 08/06/2025  Patient did not wish or was not able to name a surrogate decision maker or provide an Advance Care Plan.

## 2025-08-18 ENCOUNTER — TELEPHONE (OUTPATIENT)
Dept: BARIATRICS | Facility: CLINIC | Age: 65
End: 2025-08-18
Payer: MEDICARE

## 2025-08-26 ENCOUNTER — TELEPHONE (OUTPATIENT)
Dept: CARDIOLOGY | Facility: CLINIC | Age: 65
End: 2025-08-26
Payer: MEDICARE

## 2025-08-26 RX ORDER — VALSARTAN 80 MG/1
80 TABLET ORAL DAILY
Qty: 90 TABLET | Refills: 3 | Status: SHIPPED | OUTPATIENT
Start: 2025-08-26 | End: 2026-08-26

## 2025-08-27 ENCOUNTER — TELEPHONE (OUTPATIENT)
Dept: CARDIOLOGY | Facility: CLINIC | Age: 65
End: 2025-08-27
Payer: MEDICARE

## (undated) DEVICE — PACK CATH LAB

## (undated) DEVICE — CATH DXTERITY JL50 100CM 6FR

## (undated) DEVICE — CATH DXTERITY JL40 100CM 5FR

## (undated) DEVICE — KIT MANIFOLD LOW PRESS TUBING

## (undated) DEVICE — CATH ANGIO PRO FLO XT PGTL 6F

## (undated) DEVICE — SHEATH INTRO PINNACLE 6F 10CM

## (undated) DEVICE — ANGIOTOUCH KIT

## (undated) DEVICE — GUIDEWIRE SAFE T J TIP 145X2.5

## (undated) DEVICE — OMNIPAQUE CONTRAST 350MG/100ML

## (undated) DEVICE — SET ANGIO ACIST CVI ANGIOTOUCH

## (undated) DEVICE — KIT SYR REUSABLE

## (undated) DEVICE — CATH SWAN GANZ 7FR 110CM

## (undated) DEVICE — PAD DEFIB CADENCE ADULT R2

## (undated) DEVICE — CATH DXTERITY NOTO 100CM 6FR

## (undated) DEVICE — CATH INFINITI 4F 3DRC 100CM